# Patient Record
Sex: MALE | Race: WHITE | NOT HISPANIC OR LATINO | Employment: PART TIME | URBAN - METROPOLITAN AREA
[De-identification: names, ages, dates, MRNs, and addresses within clinical notes are randomized per-mention and may not be internally consistent; named-entity substitution may affect disease eponyms.]

---

## 2019-02-05 LAB
ALBUMIN: 4.3
ALP ISOS SERPL LEV INH-CCNC: 71 U/L
ALT SERPL-CCNC: 33 U/L
AST SERPL-CCNC: 31 U/L
BILIRUBIN TOTAL: 0.7
CALCIUM SERPL-MCNC: 9.6 MG/DL
CHLORIDE: 106
CHOLEST SERPL-MSCNC: 121 MG/DL (ref 0–200)
CO2 SERPL-SCNC: 31 MMOL/L
CREAT SERPL-MCNC: 1.3 MG/DL
GLUCOSE: 96
HDLC SERPL-MCNC: 51 MG/DL
LDLC SERPL CALC-MCNC: 56 MG/DL
POTASSIUM: 5.1
RISK FACTOR: 2.4
SODIUM: 142
TOTAL PROTEIN: 6.2 G/DL (ref 6.4–8.2)
TRIGL SERPL-MCNC: 69 MG/DL
UREA NITROGEN (BUN): 21
VLDL CHOLESTEROL: 14 MG/DL

## 2019-06-27 LAB
CREAT SERPL-MCNC: 1.2 MG/DL
PSA, SERUM: 0.4

## 2019-07-23 ENCOUNTER — PATIENT OUTREACH (OUTPATIENT)
Dept: ADMINISTRATIVE | Facility: HOSPITAL | Age: 64
End: 2019-07-23

## 2019-07-24 ENCOUNTER — OFFICE VISIT (OUTPATIENT)
Dept: FAMILY MEDICINE | Facility: CLINIC | Age: 64
End: 2019-07-24
Payer: COMMERCIAL

## 2019-07-24 ENCOUNTER — LAB VISIT (OUTPATIENT)
Dept: LAB | Facility: HOSPITAL | Age: 64
End: 2019-07-24
Attending: FAMILY MEDICINE
Payer: COMMERCIAL

## 2019-07-24 VITALS
HEART RATE: 43 BPM | SYSTOLIC BLOOD PRESSURE: 148 MMHG | DIASTOLIC BLOOD PRESSURE: 88 MMHG | WEIGHT: 193 LBS | TEMPERATURE: 98 F | HEIGHT: 69 IN | BODY MASS INDEX: 28.58 KG/M2

## 2019-07-24 DIAGNOSIS — I48.20 ATRIAL FIBRILLATION, CHRONIC: ICD-10-CM

## 2019-07-24 DIAGNOSIS — Z00.01 ENCOUNTER FOR GENERAL ADULT MEDICAL EXAMINATION WITH ABNORMAL FINDINGS: ICD-10-CM

## 2019-07-24 DIAGNOSIS — Z79.899 ENCOUNTER FOR LONG-TERM (CURRENT) USE OF MEDICATIONS: ICD-10-CM

## 2019-07-24 DIAGNOSIS — E78.5 HYPERLIPIDEMIA, UNSPECIFIED HYPERLIPIDEMIA TYPE: ICD-10-CM

## 2019-07-24 DIAGNOSIS — Z00.01 ENCOUNTER FOR GENERAL ADULT MEDICAL EXAMINATION WITH ABNORMAL FINDINGS: Primary | ICD-10-CM

## 2019-07-24 DIAGNOSIS — E55.9 VITAMIN D DEFICIENCY: ICD-10-CM

## 2019-07-24 DIAGNOSIS — Z11.59 NEED FOR HEPATITIS C SCREENING TEST: ICD-10-CM

## 2019-07-24 DIAGNOSIS — I10 HYPERTENSION, ESSENTIAL: ICD-10-CM

## 2019-07-24 LAB
25(OH)D3+25(OH)D2 SERPL-MCNC: 27 NG/ML (ref 30–96)
ALBUMIN SERPL BCP-MCNC: 3.6 G/DL (ref 3.5–5.2)
ALP SERPL-CCNC: 78 U/L (ref 55–135)
ALT SERPL W/O P-5'-P-CCNC: 92 U/L (ref 10–44)
ANION GAP SERPL CALC-SCNC: 8 MMOL/L (ref 8–16)
AST SERPL-CCNC: 106 U/L (ref 10–40)
BASOPHILS # BLD AUTO: 0.04 K/UL (ref 0–0.2)
BASOPHILS NFR BLD: 0.7 % (ref 0–1.9)
BILIRUB SERPL-MCNC: 0.7 MG/DL (ref 0.1–1)
BILIRUB UR QL STRIP: NEGATIVE
BUN SERPL-MCNC: 18 MG/DL (ref 8–23)
CALCIUM SERPL-MCNC: 9.9 MG/DL (ref 8.7–10.5)
CHLORIDE SERPL-SCNC: 103 MMOL/L (ref 95–110)
CLARITY UR: CLEAR
CO2 SERPL-SCNC: 29 MMOL/L (ref 23–29)
COLOR UR: YELLOW
CREAT SERPL-MCNC: 1 MG/DL (ref 0.5–1.4)
DIFFERENTIAL METHOD: NORMAL
EOSINOPHIL # BLD AUTO: 0.1 K/UL (ref 0–0.5)
EOSINOPHIL NFR BLD: 2.1 % (ref 0–8)
ERYTHROCYTE [DISTWIDTH] IN BLOOD BY AUTOMATED COUNT: 13.3 % (ref 11.5–14.5)
EST. GFR  (AFRICAN AMERICAN): >60 ML/MIN/1.73 M^2
EST. GFR  (NON AFRICAN AMERICAN): >60 ML/MIN/1.73 M^2
ESTIMATED AVG GLUCOSE: 111 MG/DL (ref 68–131)
GLUCOSE SERPL-MCNC: 88 MG/DL (ref 70–110)
GLUCOSE UR QL STRIP: NEGATIVE
HBA1C MFR BLD HPLC: 5.5 % (ref 4–5.6)
HCT VFR BLD AUTO: 47.9 % (ref 40–54)
HGB BLD-MCNC: 15.6 G/DL (ref 14–18)
HGB UR QL STRIP: NEGATIVE
IMM GRANULOCYTES # BLD AUTO: 0.03 K/UL (ref 0–0.04)
IMM GRANULOCYTES NFR BLD AUTO: 0.5 % (ref 0–0.5)
KETONES UR QL STRIP: NEGATIVE
LEUKOCYTE ESTERASE UR QL STRIP: NEGATIVE
LYMPHOCYTES # BLD AUTO: 1.4 K/UL (ref 1–4.8)
LYMPHOCYTES NFR BLD: 24.6 % (ref 18–48)
MCH RBC QN AUTO: 30.8 PG (ref 27–31)
MCHC RBC AUTO-ENTMCNC: 32.6 G/DL (ref 32–36)
MCV RBC AUTO: 95 FL (ref 82–98)
MONOCYTES # BLD AUTO: 0.8 K/UL (ref 0.3–1)
MONOCYTES NFR BLD: 13 % (ref 4–15)
NEUTROPHILS # BLD AUTO: 3.4 K/UL (ref 1.8–7.7)
NEUTROPHILS NFR BLD: 59.1 % (ref 38–73)
NITRITE UR QL STRIP: NEGATIVE
NRBC BLD-RTO: 0 /100 WBC
PH UR STRIP: 6 [PH] (ref 5–8)
PLATELET # BLD AUTO: 198 K/UL (ref 150–350)
PMV BLD AUTO: 11.4 FL (ref 9.2–12.9)
POTASSIUM SERPL-SCNC: 4.7 MMOL/L (ref 3.5–5.1)
PROT SERPL-MCNC: 6.5 G/DL (ref 6–8.4)
PROT UR QL STRIP: NEGATIVE
RBC # BLD AUTO: 5.06 M/UL (ref 4.6–6.2)
SODIUM SERPL-SCNC: 140 MMOL/L (ref 136–145)
SP GR UR STRIP: 1.02 (ref 1–1.03)
T3FREE SERPL-MCNC: 2.6 PG/ML (ref 2.3–4.2)
T4 SERPL-MCNC: 10.7 UG/DL (ref 4.5–11.5)
TSH SERPL DL<=0.005 MIU/L-ACNC: 1.47 UIU/ML (ref 0.4–4)
URN SPEC COLLECT METH UR: NORMAL
WBC # BLD AUTO: 5.78 K/UL (ref 3.9–12.7)

## 2019-07-24 PROCEDURE — 83036 HEMOGLOBIN GLYCOSYLATED A1C: CPT

## 2019-07-24 PROCEDURE — 84436 ASSAY OF TOTAL THYROXINE: CPT

## 2019-07-24 PROCEDURE — 99999 PR PBB SHADOW E&M-EST. PATIENT-LVL III: ICD-10-PCS | Mod: PBBFAC,,, | Performed by: FAMILY MEDICINE

## 2019-07-24 PROCEDURE — 80061 LIPID PANEL: CPT

## 2019-07-24 PROCEDURE — 84443 ASSAY THYROID STIM HORMONE: CPT

## 2019-07-24 PROCEDURE — 80053 COMPREHEN METABOLIC PANEL: CPT

## 2019-07-24 PROCEDURE — 84481 FREE ASSAY (FT-3): CPT

## 2019-07-24 PROCEDURE — 36415 COLL VENOUS BLD VENIPUNCTURE: CPT | Mod: PO

## 2019-07-24 PROCEDURE — 85025 COMPLETE CBC W/AUTO DIFF WBC: CPT

## 2019-07-24 PROCEDURE — 99999 PR PBB SHADOW E&M-EST. PATIENT-LVL III: CPT | Mod: PBBFAC,,, | Performed by: FAMILY MEDICINE

## 2019-07-24 PROCEDURE — 99386 PREV VISIT NEW AGE 40-64: CPT | Mod: S$GLB,,, | Performed by: FAMILY MEDICINE

## 2019-07-24 PROCEDURE — 99386 PR PREVENTIVE VISIT,NEW,40-64: ICD-10-PCS | Mod: S$GLB,,, | Performed by: FAMILY MEDICINE

## 2019-07-24 PROCEDURE — 81002 URINALYSIS NONAUTO W/O SCOPE: CPT | Mod: PO

## 2019-07-24 PROCEDURE — 82306 VITAMIN D 25 HYDROXY: CPT

## 2019-07-24 PROCEDURE — 86803 HEPATITIS C AB TEST: CPT

## 2019-07-24 RX ORDER — NAPROXEN SODIUM 220 MG/1
TABLET, FILM COATED ORAL
COMMUNITY

## 2019-07-24 RX ORDER — AMIODARONE HYDROCHLORIDE 200 MG/1
TABLET ORAL
COMMUNITY
End: 2022-10-21

## 2019-07-24 RX ORDER — SILDENAFIL CITRATE 20 MG/1
TABLET ORAL
Refills: 2 | COMMUNITY
Start: 2019-07-18 | End: 2019-10-29 | Stop reason: ALTCHOICE

## 2019-07-24 RX ORDER — ACETAMINOPHEN 500 MG
TABLET ORAL
COMMUNITY
End: 2019-07-30

## 2019-07-24 RX ORDER — ROSUVASTATIN CALCIUM 40 MG/1
TABLET, COATED ORAL
COMMUNITY
End: 2020-12-10

## 2019-07-24 RX ORDER — METOPROLOL TARTRATE 25 MG/1
TABLET, FILM COATED ORAL
COMMUNITY

## 2019-07-24 RX ORDER — EZETIMIBE 10 MG/1
TABLET ORAL
COMMUNITY
End: 2020-08-19 | Stop reason: SDUPTHER

## 2019-07-24 RX ORDER — DIGOXIN 250 MCG
TABLET ORAL
COMMUNITY
End: 2021-11-19

## 2019-07-24 NOTE — PATIENT INSTRUCTIONS
Healthy Heart Diet  GENERAL INFORMATION:  What is a heart healthy diet? The goal of a heart healthy diet is to decrease your risk for heart disease. There are several health conditions that can increase your risk for heart disease. Some of these conditions include unhealthy blood cholesterol levels, high blood pressure, and obesity (weighing more than your caregiver recommends). If you have any of these conditions, you should make diet and lifestyle changes as part of your treatment plan. People who have had a heart attack or stroke also should follow the heart healthy diet. The heart healthy diet may help to decrease the risk of having another heart attack or stroke.  What should I know about the different types of fat in my diet?   Unhealthy fats: A diet that is high in cholesterol, saturated fat, and trans fat may cause unhealthy cholesterol levels.    Cholesterol: Limit intake of cholesterol to less than 200 mg per day. Cholesterol is found in meat, eggs, and dairy (milk, cheese).    Saturated fat: Limit saturated fat to less than seven percent of total daily calories. Ask your caregiver how many calories you need each day. Saturated fats are found in meat and dairy.    Trans fat: Limit trans fat to less than one percent of total calories. Ask your caregiver how many calories you need each day. Foods that say trans fat free on the label may still have up to 0.5 grams of fat per serving. Trans fats are used in fried and baked foods.  Healthy fats: Unsaturated fats can be healthy for you and can help to improve your cholesterol levels. Increase your intake of healthy fats by replacing saturated and trans fats in your diet with unsaturated fat.     Monounsaturated fats: Monounsaturated fats are found in nuts and vegetable oils, such as olive, canola, safflower, and sunflower.    Polyunsaturated fats: Unsaturated fat can be found in vegetable oils, such as soybean or corn. Omega-3 fats are a type of polyunsaturated  fat that can help to decrease the risk of heart disease. Omega-3 fats are found in fish, such as salmon, herring, trout, and tuna. Omega-3 fats can also be found in plant foods, such as walnuts, flaxseed, soybeans, and canola oil.  What are other diet guidelines I should follow?   Maintain a healthy weight: Your risk of heart disease is higher if you are overweight. Your caregiver may suggest that you lose weight if you are overweight. The following are some diet changes you can make to lose weight.     Eat fewer calories: A healthy way of decreasing calories is to eat fewer foods that have added sugars and fats. Foods that are have added sugars are also high in calories, which can cause you to gain weight. Some foods that have added sugars are sweet drinks (soda and fruit drinks), candy, cakes, cookies, and pies.    Eat smaller portions: You can also decrease calories in your diet by eating smaller portions at each meal and eating fewer snacks. Ask your caregiver for more information about how to lose weight.  Decrease sodium in your diet to less than 2300 mg each day: Sodium is found in table salt and foods that have added salt. A diet that is lower in sodium may decrease blood pressure or prevent high blood pressure. Keep your blood pressure within a normal range to decrease your risk of stroke, heart disease, and heart failure.    Include omega-3 fats in your diet: Eat two servings of fish per week. One serving is about four ounces. Fish is a good source of healthy omega-3 fats. Most fish contain some mercury, but many fish contain levels that are not harmful to most people. Higher amounts of mercury can be harmful to pregnant women and children. Children and pregnant women should avoid eating fish high in mercury, such as shark or swordfish. Fish that have lower amounts of mercury include salmon, canned light tuna, and catfish.    Include high fiber foods in your diet each day: You can decrease your risk of  heart disease by following a diet that is high in fiber. Include fruit and vegetables, legumes (beans), and whole-grain foods in your diet each day to get enough fiber.    Limit alcohol: Limit the amount of alcohol you drink. Drinking too much can damage your brain, heart, and liver. The risk of getting high blood pressure and certain types of cancer are greater for people who drink too much alcohol. Drinking too much alcohol also increases the risk of having a stroke. Women should limit alcohol to one drink a day. Men should limit alcohol to two drinks a day. A drink of alcohol is 12 ounces of beer, or five ounces of wine. One and one-half ounces of liquor, such as whiskey, is one drink of alcohol. If you drink alcohol, talk to your caregiver.   What should I avoid eating and drinking while on a heart healthy diet? Learn to read labels on packaged foods before buying them. Ask your caregiver for more information about how to read food labels. The following foods are high in fat, saturated fat, cholesterol, and sodium.   Bread and other carbohydrates:     High-fat baked goods, such as doughnuts, pastries, cookies, and biscuits.    Chips, snack mixes, regular crackers, and flavored popcorn.    Pretzels, salted nuts (high in sodium).  Fruit and vegetables:     Regular, canned vegetables (high in sodium).    Fried vegetables or vegetables in butter or high-fat sauces.    Fried fruit, or fruit served with cream.  Dairy:     Whole milk, two percent milk, half-and-half creamer.    Cheese, cream cheese, sour cream.  Meats and meat substitutes:     High-fat cuts of meat (T-bone steak, regular hamburger, ribs, tiwari, sausage).    Cold cuts and hot dogs.    Whole eggs and egg yolks (limit to three servings or less per week).  Fats:     Butter, hard margarine, shortening, partially hydrogenated or tropical (coconut, palm) oils.    Other:     Salt or seasonings made with salt (high in sodium).    Soy sauce, miso soup, canned or  dried soups (high in sodium).    Ketchup, barbecue sauce, and other high-sodium sauces.    High-fat gravy and sauces, such as Dilan or cheese sauces.  What can I eat and drink while on a heart healthy diet? Ask your dietitian or caregiver how many servings to eat each day from each of the following groups of foods. The amount of servings you should eat from each food group will depend on your daily calorie needs.   Breads and other carbohydrates:     Whole grain breads, cereals (oatmeal), and pasta.    Brown rice.    Low fat, low-sodium crackers and pretzels.  Fruits and vegetables:     Fresh, frozen, or canned vegetables (no salt or low-sodium).    Fresh, frozen, dried, or canned fruit (canned in light syrup or fruit juice).  Dairy:     Nonfat (skim), one-half percent, or one percent milk.    Nonfat or low fat yogurt or cottage cheese.    Fat free or low fat cheese.  Meats and meat products:     Fish and poultry (chicken, turkey) with no skin.    Lean beef and pork (loin, round, extra lean hamburger).    Dried beans and peas, unsalted nuts, soy products.    Egg whites and substitutes.  Fats:     Unsaturated oils (olive, soy, peanut, canola, safflower, sunflower).    Vegetable oil spreads or soft margarine.    Avocado.  Other:     Herbs and spices in place of salt.    Low fat snacks (unsalted pretzels, plain popcorn).  What are some other lifestyle changes I should make?   Do not smoke: Smoking causes lung cancer and other long-term lung diseases. It increases your risk of many cancer types. Smoking also increases your risk of blood vessel disease, heart attack, and vision disorders. Not smoking may help prevent such symptoms as headaches and dizziness for yourself and those around you. Smokers have shorter lifespans than nonsmokers.     Exercise regularly: Regular exercise can help improve your cholesterol levels and decrease your risk for coronary artery disease. Regular exercise can also help you reach or  maintain a healthy weight. Get 30 minutes or more of moderate exercise or 20 minutes of intense exercise on most days of the week. To lose weight, get at least 60 minutes of exercise on most days of the week. Children should exercise for at least 60 minutes each day. Talk to your caregiver about the best exercise program for you.  What are the risks of not following a heart healthy diet? You may develop heart disease if you do not follow a heart healthy diet. High blood cholesterol puts you at a higher risk for heart disease. Untreated high blood pressure may lead to a stroke. It can also lead to a heart attack or heart or kidney failure. Obesity is linked to medical problems, such as heart disease, high blood pressure, stroke, and diabetes. You may need to follow this diet if you already had a heart attack or stroke. You may be more likely to have another stroke or heart attack if you do not follow this diet.  When should I call my caregiver? You have questions or concerns about your illness, medicine, or this diet.  CARE AGREEMENT:  You have the right to help plan your care. Discuss treatment options with your caregivers to decide what care you want to receive. You always have the right to refuse treatment.

## 2019-07-24 NOTE — PROGRESS NOTES
Your urinalysis is normal.      If you have any other tests that were ordered we will notify you once they are available.  Please let me know if you have any questions concerning this test.  We will discuss further at your next office appointment.    Also please see below for health maintenance items that are due so we may discuss those at your next office visit:    PROSTATE-SPECIFIC ANTIGEN due on 1956  Hepatitis C Screening due on 1955  TETANUS VACCINE due on 06/02/1974    Joseph Garcia MD  We Offer Telehealth & Same Day Appointments!   Book your Telehealth appointment with me through my nurse or   Clinic appointments on [employer]  [phone]     Check out my Facebook Page and Follow Me at: [email or web address]    Check out my website at [employer] by clicking on: [email or web address]    To Schedule appointments online, go to [facility]: [email or web address]

## 2019-07-24 NOTE — PROGRESS NOTES
This note is specifically for wellness visit performed today.     WELLNESS EXAM      Patient ID: Johnny Sneed is a 64 y.o. male.    Chief Complaint:  Encounter for wellness exam    Well Adult Physical: Patient here for a comprehensive physical exam.The patient reports no problems.  Do you take any herbs or supplements that were not prescribed by a doctor? no Are you taking calcium supplements? no Are you taking aspirin daily? Yes    #1 Hypertension:  Blood pressure is elevated today.  Patient reports blood pressures at home run 130/80.  He checks them with the machine.  He is going to continue checking them and let me know within numbers are.  Continue metoprolol amiodarone digoxin.  #2.  Atrial fibrillation paroxysmal:  Chronic.  Stable.  Patient sees Dr. Ed Adams regularly.  Has had cardioversion several times.  Patient is on digoxin amiodarone metoprolol and Xarelto.  Reports compliance.  No side effects reported.  #3.  Hyperlipidemia:  Chronic.  Stable.  Patient takes Crestor 40 mg and Zetia 10 mg.  Patient is fasting today.  #4.  Vitamin-D deficiency:  Patient takes 2000 units of vitamin-D daily.  Unknown last level vitamin-D.    Patient unsure his has ever been screened for hepatitis C.  Patient was born in 1955.  No other risk factors.       History:  Patient receives prostate care outside our clinic  Sees urologist Dr. Seay  Date last prostate exam:  2019  Date last PSA:  Up-to-date 0.4 February 2019  No history of STDs        ==============================================  History reviewed.   Past Medical History:   Diagnosis Date    Atrial fibrillation     Diverticulosis     Hyperlipidemia     Hypertension       Reviewed Social history  and surgical history in detail per chart.   family history includes No Known Problems in his father and mother.   Medications reviewed per chart.    Review of patient's allergies indicates:  No Known Allergies        Last 3 sets of Vitals    Vitals - 1 value  "per visit 7/24/2019   SYSTOLIC 148   DIASTOLIC 88   PULSE 43   TEMPERATURE 98   Weight (lb) 193   Weight (kg) 87.544   HEIGHT 5' 9"   BODY MASS INDEX 28.5   VISIT REPORT -   Pain Score  0       Review of Systems   Constitutional: Negative for chills, fatigue, fever and unexpected weight change.   HENT: Negative for ear pain and sore throat.    Eyes: Negative for redness and visual disturbance.   Respiratory: Negative for cough and shortness of breath.    Cardiovascular: Negative for chest pain and palpitations.   Gastrointestinal: Negative for nausea and vomiting.   Musculoskeletal: Negative for arthralgias and myalgias.   Skin: Negative for rash and wound.   Neurological: Negative for weakness and headaches.         Objective:      Physical Exam   Constitutional: He is oriented to person, place, and time. He appears well-developed and well-nourished. No distress.   HENT:   Head: Normocephalic and atraumatic.   Eyes: Pupils are equal, round, and reactive to light. EOM are normal.   Neck: Normal range of motion. Neck supple.   Cardiovascular: Normal rate, regular rhythm, normal heart sounds and intact distal pulses.   No murmur heard.  Pulmonary/Chest: Effort normal and breath sounds normal. No respiratory distress. He has no wheezes.   Musculoskeletal: Normal range of motion. He exhibits no edema.   Neurological: He is alert and oriented to person, place, and time. No cranial nerve deficit.   Skin: Skin is warm and dry. Capillary refill takes less than 2 seconds.   Psychiatric: He has a normal mood and affect. His behavior is normal.   Nursing note and vitals reviewed.        Assessment / Plan:      1. Z00.01 -patient here for annual wellness exam.  Labs ordered.  Health maintenance was reviewed and ordered.  Hepatitis C screening  Hyperlipidemia, status post CABG- NMR  Hypertension/atrial fibrillation continue meds, continue follow-up  Continue follow-up with Urology for prostate care.    Complete history and " physical was completed today.  Complete and thorough medication reconciliation was performed.  Discussed risks and benefits of medications.  Advised patient on orders and health maintenance.  We discussed old records and old labs if available.  Will request any records not available through epic.  Continue current medications listed on your summary sheet.    Requesting records from colonoscopy.      All questions were answered. Patient had no further concerns. Advised of diagnoses and plan. Follow up as planned or return sooner if symptoms persist or worsen.       Joseph Garcia MD

## 2019-07-25 ENCOUNTER — TELEPHONE (OUTPATIENT)
Dept: FAMILY MEDICINE | Facility: CLINIC | Age: 64
End: 2019-07-25

## 2019-07-25 LAB — HCV AB SERPL QL IA: NEGATIVE

## 2019-07-25 NOTE — PROGRESS NOTES
See below.  Please call patient see if he has had elevations in liver enzymes before.  Start vitamin-D protocol.  Patient is already on daily supplementation.  Tell him to take knee 81697 units once a week for 3 months.  Then repeat the level    Patient's results were released through my chart and was notified.  Please follow up to make sure that they received the results.  Released results through my chart.    I have reviewed your recent blood work.    Your complete blood count is stable within normal limits.    Your metabolic panel which shows a glucose kidney function electrolytes and liver function is abnormal showing elevations in your liver enzymes.  AST and ALT.  Have you ever had elevations in your liver enzymes before?   Thyroid studies are stable within normal limits.   Your cholesterol is pending.    Your vitamin-D is  low in you need to start on higher dose supplementation.  We will send supplementation to pharmacy.  Repeat in 3 to 6 months.  This is a once weekly medication.   .  Your hemoglobin A1c is normal.     We will discuss these results in detail at your next office visit.  Please let me know if you have any questions concerning these laboratory results.

## 2019-07-25 NOTE — PROGRESS NOTES
Your hepatitis C screening test was negative.  So you do not have hepatitis-C.    Joseph Garcia MD  We Offer Telehealth & Same Day Appointments!   Book your Telehealth appointment with me through my nurse or   Clinic appointments on [employer]  [phone]     Check out my Facebook Page and Follow Me at: [email or web address]    Check out my website at [employer] by clicking on: [email or web address]    To Schedule appointments online, go to [employer]: [email or web address]

## 2019-07-25 NOTE — TELEPHONE ENCOUNTER
----- Message from Joseph Garcia MD sent at 7/25/2019 11:38 AM CDT -----  See below.  Please call patient see if he has had elevations in liver enzymes before.  Start vitamin-D protocol.  Patient is already on daily supplementation.  Tell him to take knee 37631 units once a week for 3 months.  Then repeat the level    Patient's results were released through my chart and was notified.  Please follow up to make sure that they received the results.  Released results through my chart.    I have reviewed your recent blood work.    Your complete blood count is stable within normal limits.    Your metabolic panel which shows a glucose kidney function electrolytes and liver function is abnormal showing elevations in your liver enzymes.  AST and ALT.  Have you ever had elevations in your liver enzymes before?   Thyroid studies are stable within normal limits.   Your cholesterol is pending.    Your vitamin-D is  low in you need to start on higher dose supplementation.  We will send supplementation to pharmacy.  Repeat in 3 to 6 months.  This is a once weekly medication.   .  Your hemoglobin A1c is normal.     We will discuss these results in detail at your next office visit.  Please let me know if you have any questions concerning these laboratory results.

## 2019-07-28 LAB
CHOLEST SERPL-MCNC: 138 MG/DL
HDL SERPL QN: 8.9 NM
HDL SERPL-SCNC: 32.1 UMOL/L
HDLC SERPL-MCNC: 46 MG/DL (ref 40–59)
HLD.LARGE SERPL-SCNC: 4.1 UMOL/L
LDL SERPL QN: 20.8 NM
LDL SERPL-SCNC: 933 NMOL/L
LDL SMALL SERPL-SCNC: 598 NMOL/L
LDLC SERPL CALC-MCNC: 75 MG/DL
PATHOLOGY STUDY: ABNORMAL
TRIGL SERPL-MCNC: 83 MG/DL (ref 30–149)
VLDL LARGE SERPL-SCNC: <1.5 NMOL/L
VLDL SERPL QN: 48.3 NM

## 2019-07-29 NOTE — PROGRESS NOTES
Patient's results were released through my chart and was notified.  Please follow up to make sure that they received the results.  Released results through my chart.    I have reviewed your recent blood work.      Your cholesterol is within normal limits on medication.  Continue rosuvastatin.  Will repeat this test in 1 year.  Continue to work on diet and exercise.  You can add fish oil supplementation to increase you benefits of decreasing inflammation of cholesterol.    We will discuss these results in detail at your next office visit.  Please let me know if you have any questions concerning these laboratory results.

## 2019-07-30 ENCOUNTER — PATIENT OUTREACH (OUTPATIENT)
Dept: ADMINISTRATIVE | Facility: HOSPITAL | Age: 64
End: 2019-07-30

## 2019-07-30 DIAGNOSIS — E55.9 VITAMIN D DEFICIENCY: Primary | ICD-10-CM

## 2019-07-30 RX ORDER — ERGOCALCIFEROL 1.25 MG/1
50000 CAPSULE ORAL
Qty: 4 CAPSULE | Refills: 3 | Status: SHIPPED | OUTPATIENT
Start: 2019-07-30 | End: 2019-11-05 | Stop reason: SDUPTHER

## 2019-07-30 NOTE — TELEPHONE ENCOUNTER
Signed.  Please make sure patient has appointment to get labs done. Use Vit D def for linking these. E55.9

## 2019-07-30 NOTE — TELEPHONE ENCOUNTER
----- Message from Joseph Garcia MD sent at 7/25/2019 11:38 AM CDT -----  See below.  Please call patient see if he has had elevations in liver enzymes before.  Start vitamin-D protocol.  Patient is already on daily supplementation.  Tell him to take knee 10302 units once a week for 3 months.  Then repeat the level    Patient's results were released through my chart and was notified.  Please follow up to make sure that they received the results.  Released results through my chart.    I have reviewed your recent blood work.    Your complete blood count is stable within normal limits.    Your metabolic panel which shows a glucose kidney function electrolytes and liver function is abnormal showing elevations in your liver enzymes.  AST and ALT.  Have you ever had elevations in your liver enzymes before?   Thyroid studies are stable within normal limits.   Your cholesterol is pending.    Your vitamin-D is  low in you need to start on higher dose supplementation.  We will send supplementation to pharmacy.  Repeat in 3 to 6 months.  This is a once weekly medication.   .  Your hemoglobin A1c is normal.     We will discuss these results in detail at your next office visit.  Please let me know if you have any questions concerning these laboratory results.

## 2019-08-07 ENCOUNTER — PATIENT MESSAGE (OUTPATIENT)
Dept: FAMILY MEDICINE | Facility: CLINIC | Age: 64
End: 2019-08-07

## 2019-08-20 ENCOUNTER — PATIENT OUTREACH (OUTPATIENT)
Dept: ADMINISTRATIVE | Facility: HOSPITAL | Age: 64
End: 2019-08-20

## 2019-10-28 PROBLEM — Z00.01 ENCOUNTER FOR GENERAL ADULT MEDICAL EXAMINATION WITH ABNORMAL FINDINGS: Status: RESOLVED | Noted: 2019-07-24 | Resolved: 2019-10-28

## 2019-11-05 ENCOUNTER — LAB VISIT (OUTPATIENT)
Dept: LAB | Facility: HOSPITAL | Age: 64
End: 2019-11-05
Attending: FAMILY MEDICINE
Payer: COMMERCIAL

## 2019-11-05 DIAGNOSIS — E55.9 VITAMIN D DEFICIENCY: ICD-10-CM

## 2019-11-05 LAB — 25(OH)D3+25(OH)D2 SERPL-MCNC: 28 NG/ML (ref 30–96)

## 2019-11-05 PROCEDURE — 36415 COLL VENOUS BLD VENIPUNCTURE: CPT | Mod: PO

## 2019-11-05 PROCEDURE — 82306 VITAMIN D 25 HYDROXY: CPT

## 2019-11-06 DIAGNOSIS — Z79.899 ENCOUNTER FOR LONG-TERM (CURRENT) USE OF MEDICATIONS: ICD-10-CM

## 2019-11-06 DIAGNOSIS — Z77.098 EXPOSURE TO CHEMICAL COMPOUNDS: ICD-10-CM

## 2019-11-06 DIAGNOSIS — E78.5 HYPERLIPIDEMIA, UNSPECIFIED HYPERLIPIDEMIA TYPE: ICD-10-CM

## 2019-11-06 DIAGNOSIS — I10 HYPERTENSION, ESSENTIAL: ICD-10-CM

## 2019-11-06 DIAGNOSIS — R74.8 ELEVATED LIVER ENZYMES: Primary | ICD-10-CM

## 2019-11-06 DIAGNOSIS — E55.9 VITAMIN D DEFICIENCY: ICD-10-CM

## 2019-11-06 RX ORDER — ERGOCALCIFEROL 1.25 MG/1
50000 CAPSULE ORAL
Qty: 4 CAPSULE | Refills: 11 | Status: SHIPPED | OUTPATIENT
Start: 2019-11-06 | End: 2020-11-20 | Stop reason: SDUPTHER

## 2019-11-06 RX ORDER — CHOLECALCIFEROL (VITAMIN D3) 25 MCG
2000 TABLET ORAL DAILY
COMMUNITY
End: 2021-05-07

## 2019-11-06 NOTE — PROGRESS NOTES
CALL THE PATIENT to discuss results and document verification.    Please set up liver ultrasound in January and lab appointment on the same day with lipid panel, complete metabolic panel, vitamin-D    I have sent a message to them with the interpretation (see below).  See if they have any questions and make a follow-up appointment if not already schedule and if needed.    Also please address any outstanding health maintenance that may be due: TETANUS VACCINE due on 06/02/1974  Colonoscopy due on 06/02/2006  ====================================    My interpretation that was sent to them through [employer]:    Your vitamin-D is still low on supplementation with 96609 units weekly.  Increase supplementation by 2000 units daily with over-the-counter supplement.  Continue 47565 units weekly.  Will recheck level in January when we check your liver function.  Also will set up ultrasound of the liver prior to your January appointment.

## 2020-01-16 ENCOUNTER — TELEPHONE (OUTPATIENT)
Dept: RADIOLOGY | Facility: HOSPITAL | Age: 65
End: 2020-01-16

## 2020-01-17 ENCOUNTER — PATIENT OUTREACH (OUTPATIENT)
Dept: ADMINISTRATIVE | Facility: HOSPITAL | Age: 65
End: 2020-01-17

## 2020-01-17 ENCOUNTER — HOSPITAL ENCOUNTER (OUTPATIENT)
Dept: RADIOLOGY | Facility: HOSPITAL | Age: 65
Discharge: HOME | End: 2020-01-17
Attending: FAMILY MEDICINE
Payer: COMMERCIAL

## 2020-01-17 DIAGNOSIS — Z77.098 EXPOSURE TO CHEMICAL COMPOUNDS: ICD-10-CM

## 2020-01-17 DIAGNOSIS — R74.8 ELEVATED LIVER ENZYMES: ICD-10-CM

## 2020-01-17 PROCEDURE — 76700 US ABDOMEN COMPLETE: ICD-10-PCS | Mod: 26,,, | Performed by: RADIOLOGY

## 2020-01-17 PROCEDURE — 76700 US EXAM ABDOM COMPLETE: CPT | Mod: 26,,, | Performed by: RADIOLOGY

## 2020-01-17 PROCEDURE — 76700 US EXAM ABDOM COMPLETE: CPT | Mod: TC,PO

## 2020-01-24 ENCOUNTER — OFFICE VISIT (OUTPATIENT)
Dept: FAMILY MEDICINE | Facility: CLINIC | Age: 65
End: 2020-01-24
Payer: COMMERCIAL

## 2020-01-24 VITALS
HEIGHT: 69 IN | SYSTOLIC BLOOD PRESSURE: 139 MMHG | HEART RATE: 64 BPM | TEMPERATURE: 98 F | WEIGHT: 196.81 LBS | DIASTOLIC BLOOD PRESSURE: 86 MMHG | BODY MASS INDEX: 29.15 KG/M2

## 2020-01-24 DIAGNOSIS — Z79.899 ENCOUNTER FOR LONG-TERM (CURRENT) USE OF MEDICATIONS: Chronic | ICD-10-CM

## 2020-01-24 DIAGNOSIS — E55.9 VITAMIN D DEFICIENCY: Chronic | ICD-10-CM

## 2020-01-24 DIAGNOSIS — I48.92 ATRIAL FLUTTER, UNSPECIFIED TYPE: ICD-10-CM

## 2020-01-24 DIAGNOSIS — I48.20 ATRIAL FIBRILLATION, CHRONIC: Chronic | ICD-10-CM

## 2020-01-24 DIAGNOSIS — K76.0 FATTY LIVER: ICD-10-CM

## 2020-01-24 DIAGNOSIS — E78.5 HYPERLIPIDEMIA, UNSPECIFIED HYPERLIPIDEMIA TYPE: Chronic | ICD-10-CM

## 2020-01-24 DIAGNOSIS — I10 HYPERTENSION, ESSENTIAL: Chronic | ICD-10-CM

## 2020-01-24 DIAGNOSIS — R74.8 ELEVATED LIVER ENZYMES: Primary | ICD-10-CM

## 2020-01-24 PROCEDURE — 3075F PR MOST RECENT SYSTOLIC BLOOD PRESS GE 130-139MM HG: ICD-10-PCS | Mod: CPTII,S$GLB,, | Performed by: FAMILY MEDICINE

## 2020-01-24 PROCEDURE — 3075F SYST BP GE 130 - 139MM HG: CPT | Mod: CPTII,S$GLB,, | Performed by: FAMILY MEDICINE

## 2020-01-24 PROCEDURE — 99999 PR PBB SHADOW E&M-EST. PATIENT-LVL III: CPT | Mod: PBBFAC,,, | Performed by: FAMILY MEDICINE

## 2020-01-24 PROCEDURE — 99396 PR PREVENTIVE VISIT,EST,40-64: ICD-10-PCS | Mod: S$GLB,,, | Performed by: FAMILY MEDICINE

## 2020-01-24 PROCEDURE — 3079F DIAST BP 80-89 MM HG: CPT | Mod: CPTII,S$GLB,, | Performed by: FAMILY MEDICINE

## 2020-01-24 PROCEDURE — 99999 PR PBB SHADOW E&M-EST. PATIENT-LVL III: ICD-10-PCS | Mod: PBBFAC,,, | Performed by: FAMILY MEDICINE

## 2020-01-24 PROCEDURE — 3079F PR MOST RECENT DIASTOLIC BLOOD PRESSURE 80-89 MM HG: ICD-10-PCS | Mod: CPTII,S$GLB,, | Performed by: FAMILY MEDICINE

## 2020-01-24 PROCEDURE — 99396 PREV VISIT EST AGE 40-64: CPT | Mod: S$GLB,,, | Performed by: FAMILY MEDICINE

## 2020-01-24 RX ORDER — SILDENAFIL CITRATE 20 MG/1
TABLET ORAL
COMMUNITY
Start: 2019-12-20 | End: 2022-02-05

## 2020-01-24 NOTE — ASSESSMENT & PLAN NOTE
Patient with known fatty liver.  LFTs are stable.  Incidental finding of gallstones.  ER precautions were given.  Follow-up with GI.

## 2020-01-24 NOTE — ASSESSMENT & PLAN NOTE
Blood pressure goal less than 140/90.Discussed hypertension disease course, DASH-diet and exercise.  Discussed medication regimen importance of treating high blood pressure.  Patient advised of risk of untreated blood pressure.    ER precautions were given for symptoms of hypertensive urgency and emergency.

## 2020-01-24 NOTE — PROGRESS NOTES
PLAN:      Problem List Items Addressed This Visit     Hyperlipidemia (Chronic)     Recheck lipids in one year.  Recheck LFTs in six months.  Discussed hyperlipidemia disease course, healthy diet and increased need for exercise.  Discussed the risk of cardiovascular disease, increase stroke and heart attack risk.    Patient voiced understanding and understood the treatment plan. All questions were answered.              Atrial fibrillation, chronic (Chronic)     Continue medication regimen.  Follow-up with Cardiology.         Encounter for long-term (current) use of medications (Chronic)     Medication refills as needed.  Reviewed labs.  Stable LFTs.         Vitamin D deficiency (Chronic)     Continue vitamin-D supplementation.  Recheck in one year.         Hypertension, essential (Chronic)     Blood pressure goal less than 140/90.Discussed hypertension disease course, DASH-diet and exercise.  Discussed medication regimen importance of treating high blood pressure.  Patient advised of risk of untreated blood pressure.    ER precautions were given for symptoms of hypertensive urgency and emergency.           Atrial flutter    Elevated liver enzymes - Primary     Repeat hepatic function in six months.  Follow-up with GI.         Relevant Orders    Hepatic function panel    Fatty liver     Patient with known fatty liver.  LFTs are stable.  Incidental finding of gallstones.  ER precautions were given.  Follow-up with GI.             Future Appointments     Date Provider Specialty Appt Notes    7/24/2020  Lab     1/25/2021 Joseph Garcia MD Family Medicine annual           Medication List with Changes/Refills   Current Medications    AMIODARONE (PACERONE) 200 MG TAB        ASPIRIN 81 MG CHEW        DIGOXIN (LANOXIN) 250 MCG TABLET        ERGOCALCIFEROL (ERGOCALCIFEROL) 50,000 UNIT CAP    Take 1 capsule (50,000 Units total) by mouth every 7 days.    EZETIMIBE (ZETIA) 10 MG TABLET        METOPROLOL TARTRATE (LOPRESSOR)  25 MG TABLET        RIVAROXABAN (XARELTO) 20 MG TAB    Take 1 tablet (20 mg total) by mouth once daily.    ROSUVASTATIN (CRESTOR) 40 MG TAB        SILDENAFIL (REVATIO) 20 MG TAB    TAKE 1 TABET BY MOUTH DAILY AS NEEDED AS DIRECTED    VITAMIN D (VITAMIN D3) 1000 UNITS TAB    Take 2,000 Units by mouth once daily.       Joseph Garcia M.D.     ==========================================================================  Subjective:      Patient ID: Johnny Sneed is a 64 y.o. male.  has a past medical history of Atrial fibrillation, Diverticulosis, Hyperlipidemia, Hypertension, and Ruptured eardrum.     Chief Complaint: Annual Exam      Problem List Items Addressed This Visit     Hyperlipidemia (Chronic)    Overview     CHRONIC. STABLE. Lab analysis reviewed.   =======================================================  JANUARY 2020:  Stable on rosuvastatin 40 mg.  ======================================================  (-) CP, SOB, abdominal pain, N/V/D, constipation, jaundice, skin changes.  (-) Myalgias  Lab Results   Component Value Date    CHOL 131 01/17/2020    CHOL 121 02/05/2019     Lab Results   Component Value Date    HDL 47 01/17/2020    HDL 51 02/05/2019     Lab Results   Component Value Date    LDLCALC 71.6 01/17/2020    LDLCALC 56 02/05/2019     Lab Results   Component Value Date    TRIG 62 01/17/2020    TRIG 69 02/05/2019     Lab Results   Component Value Date    CHOLHDL 35.9 01/17/2020     Lab Results   Component Value Date    TOTALCHOLEST 2.8 01/17/2020     Lab Results   Component Value Date    ALT 93 (H) 01/17/2020    AST 84 (H) 01/17/2020    ALKPHOS 68 01/17/2020    BILITOT 0.5 01/17/2020     ======================================================           Current Assessment & Plan     Recheck lipids in one year.  Recheck LFTs in six months.  Discussed hyperlipidemia disease course, healthy diet and increased need for exercise.  Discussed the risk of cardiovascular disease, increase stroke and  heart attack risk.    Patient voiced understanding and understood the treatment plan. All questions were answered.              Atrial fibrillation, chronic (Chronic)    Overview     Initial HPI:  Chronic.  Stable.  History of several cardioversions in the past.  Patient taking amiodarone digoxin metoprolol and Xarelto.  Follows up with Dr. Ed Adams.  =======================================================  JANUARY 2020:  Patient reports that he was in a flutter at recent follow-up with Cardiology.  Patient feels well.  Stable medications.  ======================================================         Current Assessment & Plan     Continue medication regimen.  Follow-up with Cardiology.         Encounter for long-term (current) use of medications (Chronic)    Overview     CHRONIC. Stable. Compliant with medications for managed conditions. See medication list. No SE reported.   Routine lab analysis is being monitored. Refills were addressed.  Lab Results   Component Value Date    WBC 5.78 07/24/2019    HGB 15.6 07/24/2019    HCT 47.9 07/24/2019    MCV 95 07/24/2019     07/24/2019         Chemistry        Component Value Date/Time     01/17/2020 0741     02/05/2019    K 4.1 01/17/2020 0741    K 5.1 02/05/2019     01/17/2020 0741     02/05/2019    CO2 28 01/17/2020 0741    CO2 31 02/05/2019    BUN 22 01/17/2020 0741    BUN 21.0 02/05/2019    CREATININE 1.0 01/17/2020 0741    CREATININE 1.2 06/27/2019    GLU 84 01/17/2020 0741        Component Value Date/Time    CALCIUM 9.2 01/17/2020 0741    CALCIUM 9.6 02/05/2019    ALKPHOS 68 01/17/2020 0741    AST 84 (H) 01/17/2020 0741    AST 31 02/05/2019    ALT 93 (H) 01/17/2020 0741    ALT 33 02/05/2019    BILITOT 0.5 01/17/2020 0741    ESTGFRAFRICA >60.0 01/17/2020 0741    EGFRNONAA >60.0 01/17/2020 0741          Lab Results   Component Value Date    TSH 1.474 07/24/2019    N6CYJGX 10.7 07/24/2019    T3FREE 2.6 07/24/2019              Current  Assessment & Plan     Medication refills as needed.  Reviewed labs.  Stable LFTs.         Vitamin D deficiency (Chronic)    Overview     7/24/2019Vit d deficiency. Takes vitamin d supplement. No SE reported, due for level check.  ======================================================  January 2020:Chronic. Vit d deficiency. Takes vitamin d supplement. No SE reported. Fatigue is slightly improved.   Lab Results   Component Value Date    KQXXYOUN42VK 33 01/17/2020    MSVCCODJ34GK 28 (L) 11/05/2019    ZKENJTMM54IK 27 (L) 07/24/2019               Current Assessment & Plan     Continue vitamin-D supplementation.  Recheck in one year.         Hypertension, essential (Chronic)    Overview     Initial HPI:  Blood pressure is elevated today.  Patient reports blood pressures at home run 130/80.  He checks them with the machine.  He is going to continue checking them and let me know within numbers are.  Continue metoprolol amiodarone digoxin.  =======================================================  JANUARY 2020:  Well controlled today.  CHRONIC. STABLE. BP Reviewed.  Compliant with BP medications. No SE reported.   (-) CP, SOB, palpitations, dizziness, lightheadedness, HA, arm numbness, tingling or weakness, syncope.  Creatinine   Date Value Ref Range Status   01/17/2020 1.0 0.5 - 1.4 mg/dL Final   06/27/2019 1.2 mg/dL Final       ======================================================         Current Assessment & Plan     Blood pressure goal less than 140/90.Discussed hypertension disease course, DASH-diet and exercise.  Discussed medication regimen importance of treating high blood pressure.  Patient advised of risk of untreated blood pressure.    ER precautions were given for symptoms of hypertensive urgency and emergency.           Atrial flutter    Overview     New diagnosis recently found by Cardiology.  Stable medications.  No issues.         Elevated liver enzymes - Primary    Overview     Patient with chronic  elevation in LFTs.   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  Patient has known about the elevated liver enzymes and fatty liver for several years.  It is been monitored with routine blood work.    Patient is establish with [facility].  Reports he had a colonoscopy performed in or about 2015 and was told to return in 10 years.    Records have been requested.    Patient denies any abdominal pain nausea vomiting diarrhea.  No change in skin color.         Current Assessment & Plan     Repeat hepatic function in six months.  Follow-up with GI.         Fatty liver    Overview     US Abdomen Complete  Narrative: EXAMINATION:  US ABDOMEN COMPLETE    CLINICAL HISTORY:  elevated liver functions; Abnormal levels of other serum enzymes    TECHNIQUE:  Complete abdominal ultrasound (including pancreas, liver, gallbladder, common bile duct, spleen, aorta, IVC, and kidneys) was performed.    COMPARISON:  None    FINDINGS:  Complete abdominal ultrasound performed. The liver measures 13.6 cm in length, normal in size and is homogeneously increased in echotexture, most consistent with diffuse fatty infiltration..  Common bile duct is within normal limits at 0.62 cm.  There are 2 small stones noted within the gallbladder the largest measuring up to 6.6 mm.  No gallbladder wall thickening.  The sonographic Kitchen sign is reported as negative.  The visualized portions of the pancreas, IVC and abdominal aorta are within normal limits.   The right kidney measures 10.3 cm. The left kidney measures 11.0 cm. No hydronephrosis or renal masses identified.  The spleen measures 9.9 cm.  Impression: 1. Diffuse fatty infiltration of the liver.  2. Cholelithiasis.  .    Electronically signed by: Caleb Fitzgerald DO  Date:    01/17/2020  Time:    09:01              Current Assessment & Plan     Patient with known fatty liver.  LFTs are stable.  Incidental finding of gallstones.  ER precautions were given.  Follow-up with GI.                Past Medical History:  Past Medical History:   Diagnosis Date    Atrial fibrillation     Diverticulosis     Hyperlipidemia     Hypertension     Ruptured eardrum      Past Surgical History:   Procedure Laterality Date    COLONOSCOPY  2015    [facility]    CORONARY ARTERY BYPASS GRAFT  09/2014    3 Vessel Cabbage    INNER EAR SURGERY Left 1980    NASAL SEPTUM SURGERY  03/2019    Dr Davis Obrien    PROSTATE BIOPSY  04/2011     Review of patient's allergies indicates:  No Known Allergies  Medication List with Changes/Refills   Current Medications    AMIODARONE (PACERONE) 200 MG TAB        ASPIRIN 81 MG CHEW        DIGOXIN (LANOXIN) 250 MCG TABLET        ERGOCALCIFEROL (ERGOCALCIFEROL) 50,000 UNIT CAP    Take 1 capsule (50,000 Units total) by mouth every 7 days.    EZETIMIBE (ZETIA) 10 MG TABLET        METOPROLOL TARTRATE (LOPRESSOR) 25 MG TABLET        RIVAROXABAN (XARELTO) 20 MG TAB    Take 1 tablet (20 mg total) by mouth once daily.    ROSUVASTATIN (CRESTOR) 40 MG TAB        SILDENAFIL (REVATIO) 20 MG TAB    TAKE 1 TABET BY MOUTH DAILY AS NEEDED AS DIRECTED    VITAMIN D (VITAMIN D3) 1000 UNITS TAB    Take 2,000 Units by mouth once daily.      Social History     Tobacco Use    Smoking status: Never Smoker    Smokeless tobacco: Never Used   Substance Use Topics    Alcohol use: Yes     Alcohol/week: 2.0 standard drinks     Types: 2 Glasses of wine per week     Frequency: Never      Family History   Problem Relation Age of Onset    Heart disease Mother         bypass    Cancer Father         Prostate removal    Heart disease Father         CHF    Vision loss Father         Macular degen       I have reviewed the complete PMH, social history, surgical history, allergies and medications.  As well as family  "history.    Review of Systems   Constitutional: Negative for activity change.   Eyes: Negative for discharge.   Respiratory: Negative for wheezing.    Cardiovascular: Negative for chest pain and palpitations.   Gastrointestinal: Negative for constipation, diarrhea and vomiting.   Genitourinary: Negative for difficulty urinating and hematuria.   Neurological: Negative for headaches.   Psychiatric/Behavioral: Negative for dysphoric mood.     Objective:   /86   Pulse 64   Temp 98.3 °F (36.8 °C) (Oral)   Ht 5' 9" (1.753 m)   Wt 89.3 kg (196 lb 12.8 oz)   BMI 29.06 kg/m²   Physical Exam   Constitutional: He is oriented to person, place, and time. He appears well-developed and well-nourished. No distress.   HENT:   Head: Normocephalic and atraumatic.   Eyes: Pupils are equal, round, and reactive to light. EOM are normal.   Neck: Normal range of motion. Neck supple.   Cardiovascular: Normal rate, regular rhythm, normal heart sounds and intact distal pulses.   No murmur heard.  Pulmonary/Chest: Effort normal and breath sounds normal. No respiratory distress. He has no wheezes.   Abdominal: Soft. Bowel sounds are normal.   Musculoskeletal: Normal range of motion. He exhibits no edema.   Neurological: He is alert and oriented to person, place, and time. No cranial nerve deficit.   Skin: Skin is warm and dry. Capillary refill takes less than 2 seconds.   Psychiatric: He has a normal mood and affect. His behavior is normal.   Nursing note and vitals reviewed.      Assessment:     1. Elevated liver enzymes    2. Hypertension, essential    3. Vitamin D deficiency    4. Encounter for long-term (current) use of medications    5. Atrial fibrillation, chronic    6. Hyperlipidemia, unspecified hyperlipidemia type    7. Atrial flutter, unspecified type    8. Fatty liver      MDM:   Patient is here for annual wellness exam.  Review of labs.  See other note.  I have Reviewed and summarized old records.  I have performed " thorough medication reconciliation today and discussed risk and benefits of each medication.  I have reviewed labs and discussed with patient.  All questions were answered.  I am requesting old records and will review them once they are available.    I have signed for the following orders AND/OR meds.  Orders Placed This Encounter   Procedures    Hepatic function panel     Standing Status:   Future     Standing Expiration Date:   1/24/2021           Follow up in about 1 year (around 1/24/2021), or if symptoms worsen or fail to improve, for Annual Wellness Exam.    If no improvement in symptoms or symptoms worsen, advised to call/follow-up at clinic or go to ER. Patient voiced understanding and all questions/concerns were addressed.     DISCLAIMER: This note was compiled by using a speech recognition dictation system and therefore please be aware that typographical / speech recognition errors can and do occur.  Please contact me if you see any errors specifically.    Joseph Garcia M.D.       Office: [phone]   [address] [place] [address]  FAX: [phone]

## 2020-01-24 NOTE — PATIENT INSTRUCTIONS
Follow up in about 1 year (around 1/24/2021), or if symptoms worsen or fail to improve, for Annual Wellness Exam.     If no improvement in symptoms or symptoms worsen, please be advised to call MD, follow-up at clinic and/or go to ER if becomes severe.    Joseph Garcia M.D.         We Offer TELEHEALTH & Same Day Appointments!   Book your Telehealth appointment with me through my nurse or   Clinic appointments on [employer]!    [address] [place] [address]    Office: [phone]     FAX: [phone]    Check out my Facebook Page and Follow Me at: CLICK HERE    Check out my website at [employer] by clicking on: CLICK HERE    To Schedule appointments online, go to [facility] [employer]: CLICK HERE     Location: [email or web address]

## 2020-01-24 NOTE — ASSESSMENT & PLAN NOTE
Recheck lipids in one year.  Recheck LFTs in six months.  Discussed hyperlipidemia disease course, healthy diet and increased need for exercise.  Discussed the risk of cardiovascular disease, increase stroke and heart attack risk.    Patient voiced understanding and understood the treatment plan. All questions were answered.

## 2020-02-06 ENCOUNTER — OFFICE VISIT (OUTPATIENT)
Dept: FAMILY MEDICINE | Facility: CLINIC | Age: 65
End: 2020-02-06
Payer: COMMERCIAL

## 2020-02-06 VITALS
TEMPERATURE: 98 F | WEIGHT: 195.19 LBS | HEIGHT: 69 IN | HEART RATE: 73 BPM | BODY MASS INDEX: 28.91 KG/M2 | SYSTOLIC BLOOD PRESSURE: 139 MMHG | DIASTOLIC BLOOD PRESSURE: 76 MMHG

## 2020-02-06 DIAGNOSIS — R05.9 COUGH: Primary | ICD-10-CM

## 2020-02-06 PROCEDURE — 3008F PR BODY MASS INDEX (BMI) DOCUMENTED: ICD-10-PCS | Mod: CPTII,S$GLB,, | Performed by: FAMILY MEDICINE

## 2020-02-06 PROCEDURE — 99213 PR OFFICE/OUTPT VISIT, EST, LEVL III, 20-29 MIN: ICD-10-PCS | Mod: 25,S$GLB,, | Performed by: FAMILY MEDICINE

## 2020-02-06 PROCEDURE — 3075F PR MOST RECENT SYSTOLIC BLOOD PRESS GE 130-139MM HG: ICD-10-PCS | Mod: CPTII,S$GLB,, | Performed by: FAMILY MEDICINE

## 2020-02-06 PROCEDURE — 3078F DIAST BP <80 MM HG: CPT | Mod: CPTII,S$GLB,, | Performed by: FAMILY MEDICINE

## 2020-02-06 PROCEDURE — 3008F BODY MASS INDEX DOCD: CPT | Mod: CPTII,S$GLB,, | Performed by: FAMILY MEDICINE

## 2020-02-06 PROCEDURE — 96372 PR INJECTION,THERAP/PROPH/DIAG2ST, IM OR SUBCUT: ICD-10-PCS | Mod: S$GLB,,, | Performed by: FAMILY MEDICINE

## 2020-02-06 PROCEDURE — 99999 PR PBB SHADOW E&M-EST. PATIENT-LVL IV: ICD-10-PCS | Mod: PBBFAC,,, | Performed by: FAMILY MEDICINE

## 2020-02-06 PROCEDURE — 99213 OFFICE O/P EST LOW 20 MIN: CPT | Mod: 25,S$GLB,, | Performed by: FAMILY MEDICINE

## 2020-02-06 PROCEDURE — 3078F PR MOST RECENT DIASTOLIC BLOOD PRESSURE < 80 MM HG: ICD-10-PCS | Mod: CPTII,S$GLB,, | Performed by: FAMILY MEDICINE

## 2020-02-06 PROCEDURE — 99999 PR PBB SHADOW E&M-EST. PATIENT-LVL IV: CPT | Mod: PBBFAC,,, | Performed by: FAMILY MEDICINE

## 2020-02-06 PROCEDURE — 96372 THER/PROPH/DIAG INJ SC/IM: CPT | Mod: S$GLB,,, | Performed by: FAMILY MEDICINE

## 2020-02-06 PROCEDURE — 3075F SYST BP GE 130 - 139MM HG: CPT | Mod: CPTII,S$GLB,, | Performed by: FAMILY MEDICINE

## 2020-02-06 RX ORDER — BETAMETHASONE SODIUM PHOSPHATE AND BETAMETHASONE ACETATE 3; 3 MG/ML; MG/ML
9 INJECTION, SUSPENSION INTRA-ARTICULAR; INTRALESIONAL; INTRAMUSCULAR; SOFT TISSUE
Status: COMPLETED | OUTPATIENT
Start: 2020-02-06 | End: 2020-02-06

## 2020-02-06 RX ORDER — MONTELUKAST SODIUM 10 MG/1
10 TABLET ORAL NIGHTLY
Qty: 30 TABLET | Refills: 0 | Status: SHIPPED | OUTPATIENT
Start: 2020-02-06 | End: 2020-03-07

## 2020-02-06 RX ADMIN — BETAMETHASONE SODIUM PHOSPHATE AND BETAMETHASONE ACETATE 9 MG: 3; 3 INJECTION, SUSPENSION INTRA-ARTICULAR; INTRALESIONAL; INTRAMUSCULAR; SOFT TISSUE at 10:02

## 2020-02-06 NOTE — PATIENT INSTRUCTIONS
Follow up if symptoms worsen or fail to improve.     If no improvement in symptoms or symptoms worsen, please be advised to call MD, follow-up at clinic and/or go to ER if becomes severe.    Joseph Garcia M.D.        We Offer TELEHEALTH & Same Day Appointments!   Book your Telehealth appointment with me through my nurse or   Clinic appointments on [employer]!    [address] [place] [address]    Office: [phone]   FAX: [phone]    Check out my Facebook Page and Follow Me at: [email or web address]    Check out my website at [facility] by clicking on: [email or web address]    To Schedule appointments online, go to [employer]: [email or web address]

## 2020-02-06 NOTE — PROGRESS NOTES
PLAN:      Problem List Items Addressed This Visit     Cough - Primary     Likely allergic due to seasonal changes based off a history.  Lungs are clear on exam.  Upper respiratory exam shows inflammation.  Steroid shot and start Singulair at night.  Patient advised to take antihistamine such as Zyrtec during the day.Discussed condition course and signs and symptoms to expect.  Patient advised take anti-inflammatories and or Tylenol for pain or fever.  ER precautions.  Call MD or follow-up to clinic if not improving or worsening symptoms.           Relevant Medications    montelukast (SINGULAIR) 10 mg tablet    betamethasone acetate-betamethasone sodium phosphate injection 9 mg (Completed)        Future Appointments     Date Provider Specialty Appt Notes    7/24/2020  Lab     1/25/2021 Joseph Garcia MD Family Medicine annual           Medication List with Changes/Refills   New Medications    MONTELUKAST (SINGULAIR) 10 MG TABLET    Take 1 tablet (10 mg total) by mouth every evening.   Current Medications    AMIODARONE (PACERONE) 200 MG TAB        ASPIRIN 81 MG CHEW        DIGOXIN (LANOXIN) 250 MCG TABLET        ERGOCALCIFEROL (ERGOCALCIFEROL) 50,000 UNIT CAP    Take 1 capsule (50,000 Units total) by mouth every 7 days.    EZETIMIBE (ZETIA) 10 MG TABLET        METOPROLOL TARTRATE (LOPRESSOR) 25 MG TABLET        RIVAROXABAN (XARELTO) 20 MG TAB    Take 1 tablet (20 mg total) by mouth once daily.    ROSUVASTATIN (CRESTOR) 40 MG TAB        SILDENAFIL (REVATIO) 20 MG TAB    TAKE 1 TABET BY MOUTH DAILY AS NEEDED AS DIRECTED    VITAMIN D (VITAMIN D3) 1000 UNITS TAB    Take 2,000 Units by mouth once daily.       Joseph Garcia M.D.     ==========================================================================  Subjective:      Patient ID: Johnny Sneed is a 64 y.o. male.  has a past medical history of Atrial fibrillation, Diverticulosis, Hyperlipidemia, Hypertension, and Ruptured eardrum.     Chief Complaint:  Cough (sometimes productive, sometimes non-productive)    Patient here today complaining of chronic cough.  Patient needs colonoscopy, HIV screening, PCV 23 vaccine, shingles vaccine and tetanus vaccine to satisfy health maintenance.  Problem List Items Addressed This Visit     Cough - Primary    Overview     Answers for HPI/ROS submitted by the patient on 2/6/2020   Cough  Chronicity: recurrent  Onset: in the past 7 days  Progression since onset: waxing and waning  Frequency: hourly  Cough characteristics: non-productive, productive of purulent sputum  Risk factors for lung disease: animal exposure  asthma: No  bronchiectasis: No  bronchitis: No  COPD: No  emphysema: No  pneumonia: No  Treatments tried: OTC cough suppressant; drops, cepacol   Improvement on treatment: mild  =======================================================  February 2020:  Patient states that this occurs every time the weather changes.  Denies any wheezing, chest pain or shortness of breath.  Cough is irritating and worse at night when lying flat.  Patient takes Benadryl with some relief.  Patient does not take any antihistamine.  ======================================================         Current Assessment & Plan     Likely allergic due to seasonal changes based off a history.  Lungs are clear on exam.  Upper respiratory exam shows inflammation.  Steroid shot and start Singulair at night.  Patient advised to take antihistamine such as Zyrtec during the day.Discussed condition course and signs and symptoms to expect.  Patient advised take anti-inflammatories and or Tylenol for pain or fever.  ER precautions.  Call MD or follow-up to clinic if not improving or worsening symptoms.                  Past Medical History:  Past Medical History:   Diagnosis Date    Atrial fibrillation     Diverticulosis     Hyperlipidemia     Hypertension     Ruptured eardrum      Past Surgical History:   Procedure Laterality Date    COLONOSCOPY  2015     "[facility]    CORONARY ARTERY BYPASS GRAFT  09/2014    [facility]    INNER EAR SURGERY Left 1980    NASAL SEPTUM SURGERY  03/2019    Dr Davis Obrien    PROSTATE BIOPSY  04/2011     Review of patient's allergies indicates:  No Known Allergies  Medication List with Changes/Refills   New Medications    MONTELUKAST (SINGULAIR) 10 MG TABLET    Take 1 tablet (10 mg total) by mouth every evening.   Current Medications    AMIODARONE (PACERONE) 200 MG TAB        ASPIRIN 81 MG CHEW        DIGOXIN (LANOXIN) 250 MCG TABLET        ERGOCALCIFEROL (ERGOCALCIFEROL) 50,000 UNIT CAP    Take 1 capsule (50,000 Units total) by mouth every 7 days.    EZETIMIBE (ZETIA) 10 MG TABLET        METOPROLOL TARTRATE (LOPRESSOR) 25 MG TABLET        RIVAROXABAN (XARELTO) 20 MG TAB    Take 1 tablet (20 mg total) by mouth once daily.    ROSUVASTATIN (CRESTOR) 40 MG TAB        SILDENAFIL (REVATIO) 20 MG TAB    TAKE 1 TABET BY MOUTH DAILY AS NEEDED AS DIRECTED    VITAMIN D (VITAMIN D3) 1000 UNITS TAB    Take 2,000 Units by mouth once daily.      Social History     Tobacco Use    Smoking status: Never Smoker    Smokeless tobacco: Never Used   Substance Use Topics    Alcohol use: Yes     Alcohol/week: 2.0 standard drinks     Types: 2 Glasses of wine per week     Frequency: Never      Family History   Problem Relation Age of Onset    Heart disease Mother         bypass    Cancer Father         Prostate removal    Heart disease Father         CHF    Vision loss Father         Macular degen       I have reviewed the complete PMH, social history, surgical history, allergies and medications.  As well as family history.    Review of Systems   HENT: Positive for postnasal drip, rhinorrhea and sore throat.    Respiratory: Positive for cough.    Allergic/Immunologic: Negative for environmental allergies.     Objective:   /76   Pulse 73   Temp 97.8 °F (36.6 °C) (Oral)   Ht 5' 9" (1.753 m)   Wt 88.5 kg (195 lb 3.2 oz)   " BMI 28.83 kg/m²   Physical Exam   Constitutional: He is oriented to person, place, and time. He appears well-developed and well-nourished. No distress.   HENT:   Head: Normocephalic and atraumatic.   Right Ear: Tympanic membrane is bulging. Tympanic membrane is not injected and not erythematous. A middle ear effusion is present.   Left Ear: Tympanic membrane is bulging. Tympanic membrane is not injected and not erythematous. A middle ear effusion is present.   Nose: Mucosal edema and rhinorrhea present.   Mouth/Throat: Posterior oropharyngeal erythema present. No oropharyngeal exudate or posterior oropharyngeal edema.   Eyes: Pupils are equal, round, and reactive to light. EOM are normal.   Neck: Normal range of motion. Neck supple.   Cardiovascular: Normal rate, regular rhythm, normal heart sounds and intact distal pulses.   No murmur heard.  Pulmonary/Chest: Effort normal and breath sounds normal. No stridor. No respiratory distress. He has no wheezes. He has no rales. He exhibits no tenderness.   Musculoskeletal: Normal range of motion. He exhibits no edema.   Neurological: He is alert and oriented to person, place, and time. No cranial nerve deficit.   Skin: Skin is warm and dry. Capillary refill takes less than 2 seconds.   Psychiatric: He has a normal mood and affect. His behavior is normal.   Nursing note and vitals reviewed.      Assessment:     1. Cough      MDM:     Low complexity.   - risk of corticosteroids reviewed (elevated BP/glucose, insomnia, psychosis, bone loss, etc) and patient expressed understanding    I have signed for the following orders AND/OR meds.  No orders of the defined types were placed in this encounter.    Medications Ordered This Encounter   Medications    betamethasone acetate-betamethasone sodium phosphate injection 9 mg    montelukast (SINGULAIR) 10 mg tablet     Sig: Take 1 tablet (10 mg total) by mouth every evening.     Dispense:  30 tablet     Refill:  0        Follow up  if symptoms worsen or fail to improve.    If no improvement in symptoms or symptoms worsen, advised to call/follow-up at clinic or go to ER. Patient voiced understanding and all questions/concerns were addressed.     DISCLAIMER: This note was compiled by using a speech recognition dictation system and therefore please be aware that typographical / speech recognition errors can and do occur.  Please contact me if you see any errors specifically.    Joseph Garcia M.D.       Office: [phone] [address] [place] [address]  FAX: [phone]

## 2020-02-06 NOTE — ASSESSMENT & PLAN NOTE
Likely allergic due to seasonal changes based off a history.  Lungs are clear on exam.  Upper respiratory exam shows inflammation.  Steroid shot and start Singulair at night.  Patient advised to take antihistamine such as Zyrtec during the day.Discussed condition course and signs and symptoms to expect.  Patient advised take anti-inflammatories and or Tylenol for pain or fever.  ER precautions.  Call MD or follow-up to clinic if not improving or worsening symptoms.

## 2020-07-24 ENCOUNTER — LAB VISIT (OUTPATIENT)
Dept: LAB | Facility: HOSPITAL | Age: 65
End: 2020-07-24
Attending: FAMILY MEDICINE
Payer: COMMERCIAL

## 2020-07-24 DIAGNOSIS — R74.8 ELEVATED LIVER ENZYMES: ICD-10-CM

## 2020-07-24 LAB
ALBUMIN SERPL BCP-MCNC: 3.3 G/DL (ref 3.5–5.2)
ALP SERPL-CCNC: 73 U/L (ref 55–135)
ALT SERPL W/O P-5'-P-CCNC: 56 U/L (ref 10–44)
AST SERPL-CCNC: 40 U/L (ref 10–40)
BILIRUB DIRECT SERPL-MCNC: 0.2 MG/DL (ref 0.1–0.3)
BILIRUB SERPL-MCNC: 0.5 MG/DL (ref 0.1–1)
PROT SERPL-MCNC: 6.3 G/DL (ref 6–8.4)

## 2020-07-24 PROCEDURE — 80076 HEPATIC FUNCTION PANEL: CPT

## 2020-07-24 PROCEDURE — 36415 COLL VENOUS BLD VENIPUNCTURE: CPT | Mod: PO

## 2020-07-25 DIAGNOSIS — Z79.899 ENCOUNTER FOR LONG-TERM (CURRENT) USE OF MEDICATIONS: ICD-10-CM

## 2020-07-25 DIAGNOSIS — Z13.6 ENCOUNTER FOR LIPID SCREENING FOR CARDIOVASCULAR DISEASE: ICD-10-CM

## 2020-07-25 DIAGNOSIS — Z13.220 ENCOUNTER FOR LIPID SCREENING FOR CARDIOVASCULAR DISEASE: ICD-10-CM

## 2020-07-25 DIAGNOSIS — Z00.01 ENCOUNTER FOR GENERAL ADULT MEDICAL EXAMINATION WITH ABNORMAL FINDINGS: Primary | ICD-10-CM

## 2020-07-25 NOTE — PROGRESS NOTES
Please CALL patient with results and Document verification.   [phone]  Liver function has improved.    Repeat annual wellness labs in six months.    Request PSA from Dr. Seay urologist in coming    Requests colonoscopy results.  See if patient would like to repeat colonoscopy if due.

## 2020-08-19 DIAGNOSIS — E78.5 HYPERLIPIDEMIA, UNSPECIFIED HYPERLIPIDEMIA TYPE: Primary | ICD-10-CM

## 2020-08-19 RX ORDER — EZETIMIBE 10 MG/1
10 TABLET ORAL DAILY
Qty: 90 TABLET | Refills: 3 | Status: SHIPPED | OUTPATIENT
Start: 2020-08-19 | End: 2022-02-20

## 2020-08-21 DIAGNOSIS — Z12.11 COLON CANCER SCREENING: ICD-10-CM

## 2020-09-17 ENCOUNTER — PATIENT MESSAGE (OUTPATIENT)
Dept: ADMINISTRATIVE | Facility: HOSPITAL | Age: 65
End: 2020-09-17

## 2020-10-05 ENCOUNTER — PATIENT MESSAGE (OUTPATIENT)
Dept: ADMINISTRATIVE | Facility: HOSPITAL | Age: 65
End: 2020-10-05

## 2020-10-07 DIAGNOSIS — I48.20 ATRIAL FIBRILLATION, CHRONIC: Primary | Chronic | ICD-10-CM

## 2020-10-07 NOTE — TELEPHONE ENCOUNTER
Received refill request on pended medication from [facility], medication not found on medication list, please advise.

## 2020-11-20 DIAGNOSIS — E55.9 VITAMIN D DEFICIENCY: ICD-10-CM

## 2020-11-20 RX ORDER — ERGOCALCIFEROL 1.25 MG/1
50000 CAPSULE ORAL
Qty: 4 CAPSULE | Refills: 11 | Status: SHIPPED | OUTPATIENT
Start: 2020-11-20 | End: 2022-02-20

## 2020-12-04 ENCOUNTER — PATIENT OUTREACH (OUTPATIENT)
Dept: ADMINISTRATIVE | Facility: HOSPITAL | Age: 65
End: 2020-12-04

## 2020-12-04 NOTE — PROGRESS NOTES
Working colonoscopy report; Searched Care Everywhere, [employer]. Per notes pt had colonoscopy in 2015 with [facility]. I have faxed colonoscopy request to [facility].

## 2020-12-10 RX ORDER — ROSUVASTATIN CALCIUM 40 MG/1
TABLET, COATED ORAL
Qty: 90 TABLET | Refills: 4 | Status: SHIPPED | OUTPATIENT
Start: 2020-12-10 | End: 2022-06-15 | Stop reason: SDUPTHER

## 2021-03-08 ENCOUNTER — PATIENT OUTREACH (OUTPATIENT)
Dept: ADMINISTRATIVE | Facility: HOSPITAL | Age: 66
End: 2021-03-08

## 2021-03-19 ENCOUNTER — PATIENT OUTREACH (OUTPATIENT)
Dept: ADMINISTRATIVE | Facility: HOSPITAL | Age: 66
End: 2021-03-19

## 2021-03-29 ENCOUNTER — PATIENT OUTREACH (OUTPATIENT)
Dept: ADMINISTRATIVE | Facility: HOSPITAL | Age: 66
End: 2021-03-29

## 2021-05-07 ENCOUNTER — OFFICE VISIT (OUTPATIENT)
Dept: FAMILY MEDICINE | Facility: CLINIC | Age: 66
End: 2021-05-07
Payer: COMMERCIAL

## 2021-05-07 VITALS
RESPIRATION RATE: 12 BRPM | OXYGEN SATURATION: 96 % | SYSTOLIC BLOOD PRESSURE: 129 MMHG | DIASTOLIC BLOOD PRESSURE: 74 MMHG | BODY MASS INDEX: 29.57 KG/M2 | HEIGHT: 69 IN | HEART RATE: 59 BPM | WEIGHT: 199.63 LBS | TEMPERATURE: 98 F

## 2021-05-07 DIAGNOSIS — Z23 NEED FOR VACCINATION: ICD-10-CM

## 2021-05-07 DIAGNOSIS — Z79.899 ENCOUNTER FOR LONG-TERM (CURRENT) USE OF MEDICATIONS: ICD-10-CM

## 2021-05-07 DIAGNOSIS — Z13.220 ENCOUNTER FOR LIPID SCREENING FOR CARDIOVASCULAR DISEASE: ICD-10-CM

## 2021-05-07 DIAGNOSIS — Z00.00 WELL ADULT EXAM: Primary | ICD-10-CM

## 2021-05-07 DIAGNOSIS — Z13.6 ENCOUNTER FOR LIPID SCREENING FOR CARDIOVASCULAR DISEASE: ICD-10-CM

## 2021-05-07 DIAGNOSIS — E55.9 VITAMIN D DEFICIENCY: ICD-10-CM

## 2021-05-07 DIAGNOSIS — J30.89 SEASONAL ALLERGIC RHINITIS DUE TO OTHER ALLERGIC TRIGGER: ICD-10-CM

## 2021-05-07 PROCEDURE — 3008F PR BODY MASS INDEX (BMI) DOCUMENTED: ICD-10-PCS | Mod: CPTII,S$GLB,, | Performed by: FAMILY MEDICINE

## 2021-05-07 PROCEDURE — 99396 PR PREVENTIVE VISIT,EST,40-64: ICD-10-PCS | Mod: 25,S$GLB,, | Performed by: FAMILY MEDICINE

## 2021-05-07 PROCEDURE — 99396 PREV VISIT EST AGE 40-64: CPT | Mod: 25,S$GLB,, | Performed by: FAMILY MEDICINE

## 2021-05-07 PROCEDURE — 90471 PNEUMOCOCCAL POLYSACCHARIDE VACCINE 23-VALENT =>2YO SQ IM: ICD-10-PCS | Mod: S$GLB,,, | Performed by: FAMILY MEDICINE

## 2021-05-07 PROCEDURE — 90732 PNEUMOCOCCAL POLYSACCHARIDE VACCINE 23-VALENT =>2YO SQ IM: ICD-10-PCS | Mod: S$GLB,,, | Performed by: FAMILY MEDICINE

## 2021-05-07 PROCEDURE — 90732 PPSV23 VACC 2 YRS+ SUBQ/IM: CPT | Mod: S$GLB,,, | Performed by: FAMILY MEDICINE

## 2021-05-07 PROCEDURE — 3008F BODY MASS INDEX DOCD: CPT | Mod: CPTII,S$GLB,, | Performed by: FAMILY MEDICINE

## 2021-05-07 PROCEDURE — 1126F PR PAIN SEVERITY QUANTIFIED, NO PAIN PRESENT: ICD-10-PCS | Mod: S$GLB,,, | Performed by: FAMILY MEDICINE

## 2021-05-07 PROCEDURE — 90471 IMMUNIZATION ADMIN: CPT | Mod: S$GLB,,, | Performed by: FAMILY MEDICINE

## 2021-05-07 PROCEDURE — 99999 PR PBB SHADOW E&M-EST. PATIENT-LVL V: ICD-10-PCS | Mod: PBBFAC,,, | Performed by: FAMILY MEDICINE

## 2021-05-07 PROCEDURE — 99999 PR PBB SHADOW E&M-EST. PATIENT-LVL V: CPT | Mod: PBBFAC,,, | Performed by: FAMILY MEDICINE

## 2021-05-07 PROCEDURE — 1126F AMNT PAIN NOTED NONE PRSNT: CPT | Mod: S$GLB,,, | Performed by: FAMILY MEDICINE

## 2021-05-07 RX ORDER — PREDNISONE 20 MG/1
20 TABLET ORAL DAILY
Qty: 7 TABLET | Refills: 0 | Status: SHIPPED | OUTPATIENT
Start: 2021-05-07 | End: 2021-05-14

## 2021-05-10 PROBLEM — J30.2 SEASONAL ALLERGIC RHINITIS: Status: ACTIVE | Noted: 2021-05-10

## 2021-08-03 ENCOUNTER — PATIENT MESSAGE (OUTPATIENT)
Dept: FAMILY MEDICINE | Facility: CLINIC | Age: 66
End: 2021-08-03

## 2021-08-03 DIAGNOSIS — U07.1 COVID-19: Primary | ICD-10-CM

## 2021-08-04 ENCOUNTER — INFUSION (OUTPATIENT)
Dept: INFECTIOUS DISEASES | Facility: HOSPITAL | Age: 66
End: 2021-08-04
Attending: FAMILY MEDICINE
Payer: MEDICARE

## 2021-08-04 VITALS
RESPIRATION RATE: 18 BRPM | DIASTOLIC BLOOD PRESSURE: 64 MMHG | HEART RATE: 74 BPM | OXYGEN SATURATION: 95 % | SYSTOLIC BLOOD PRESSURE: 109 MMHG | TEMPERATURE: 98 F

## 2021-08-04 DIAGNOSIS — U07.1 COVID-19: Primary | ICD-10-CM

## 2021-08-04 PROCEDURE — 25000003 PHARM REV CODE 250: Performed by: INTERNAL MEDICINE

## 2021-08-04 PROCEDURE — 63600175 PHARM REV CODE 636 W HCPCS: Performed by: INTERNAL MEDICINE

## 2021-08-04 PROCEDURE — M0243 CASIRIVI AND IMDEVI INFUSION: HCPCS | Performed by: INTERNAL MEDICINE

## 2021-08-04 RX ORDER — DIPHENHYDRAMINE HYDROCHLORIDE 50 MG/ML
25 INJECTION INTRAMUSCULAR; INTRAVENOUS ONCE AS NEEDED
Status: DISCONTINUED | OUTPATIENT
Start: 2021-08-04 | End: 2022-10-21

## 2021-08-04 RX ORDER — SODIUM CHLORIDE 0.9 % (FLUSH) 0.9 %
10 SYRINGE (ML) INJECTION
Status: DISCONTINUED | OUTPATIENT
Start: 2021-08-04 | End: 2022-10-21

## 2021-08-04 RX ORDER — ONDANSETRON 4 MG/1
4 TABLET, ORALLY DISINTEGRATING ORAL ONCE AS NEEDED
Status: DISCONTINUED | OUTPATIENT
Start: 2021-08-04 | End: 2022-10-21

## 2021-08-04 RX ORDER — EPINEPHRINE 0.3 MG/.3ML
0.3 INJECTION SUBCUTANEOUS
Status: DISCONTINUED | OUTPATIENT
Start: 2021-08-04 | End: 2022-10-21

## 2021-08-04 RX ORDER — ACETAMINOPHEN 325 MG/1
650 TABLET ORAL ONCE AS NEEDED
Status: DISCONTINUED | OUTPATIENT
Start: 2021-08-04 | End: 2022-10-21

## 2021-08-04 RX ORDER — ALBUTEROL SULFATE 90 UG/1
2 AEROSOL, METERED RESPIRATORY (INHALATION)
Status: DISCONTINUED | OUTPATIENT
Start: 2021-08-04 | End: 2022-10-21

## 2021-08-04 RX ADMIN — CASIRIVIMAB AND IMDEVIMAB 600 MG: 600; 600 INJECTION, SOLUTION, CONCENTRATE INTRAVENOUS at 11:08

## 2021-11-19 DIAGNOSIS — I48.20 ATRIAL FIBRILLATION, CHRONIC: Chronic | ICD-10-CM

## 2022-02-05 ENCOUNTER — PATIENT MESSAGE (OUTPATIENT)
Dept: FAMILY MEDICINE | Facility: CLINIC | Age: 67
End: 2022-02-05
Payer: COMMERCIAL

## 2022-02-05 ENCOUNTER — OFFICE VISIT (OUTPATIENT)
Dept: FAMILY MEDICINE | Facility: CLINIC | Age: 67
End: 2022-02-05
Payer: COMMERCIAL

## 2022-02-05 VITALS
WEIGHT: 205 LBS | TEMPERATURE: 98 F | OXYGEN SATURATION: 98 % | SYSTOLIC BLOOD PRESSURE: 134 MMHG | DIASTOLIC BLOOD PRESSURE: 82 MMHG | BODY MASS INDEX: 30.27 KG/M2

## 2022-02-05 DIAGNOSIS — J06.9 UPPER RESPIRATORY TRACT INFECTION, UNSPECIFIED TYPE: Primary | ICD-10-CM

## 2022-02-05 PROCEDURE — 1160F RVW MEDS BY RX/DR IN RCRD: CPT | Mod: CPTII,95,, | Performed by: FAMILY MEDICINE

## 2022-02-05 PROCEDURE — 3079F DIAST BP 80-89 MM HG: CPT | Mod: CPTII,95,, | Performed by: FAMILY MEDICINE

## 2022-02-05 PROCEDURE — 99213 PR OFFICE/OUTPT VISIT, EST, LEVL III, 20-29 MIN: ICD-10-PCS | Mod: 95,,, | Performed by: FAMILY MEDICINE

## 2022-02-05 PROCEDURE — 1159F MED LIST DOCD IN RCRD: CPT | Mod: CPTII,95,, | Performed by: FAMILY MEDICINE

## 2022-02-05 PROCEDURE — 3008F PR BODY MASS INDEX (BMI) DOCUMENTED: ICD-10-PCS | Mod: CPTII,95,, | Performed by: FAMILY MEDICINE

## 2022-02-05 PROCEDURE — 1160F PR REVIEW ALL MEDS BY PRESCRIBER/CLIN PHARMACIST DOCUMENTED: ICD-10-PCS | Mod: CPTII,95,, | Performed by: FAMILY MEDICINE

## 2022-02-05 PROCEDURE — 3075F PR MOST RECENT SYSTOLIC BLOOD PRESS GE 130-139MM HG: ICD-10-PCS | Mod: CPTII,95,, | Performed by: FAMILY MEDICINE

## 2022-02-05 PROCEDURE — 1159F PR MEDICATION LIST DOCUMENTED IN MEDICAL RECORD: ICD-10-PCS | Mod: CPTII,95,, | Performed by: FAMILY MEDICINE

## 2022-02-05 PROCEDURE — 3079F PR MOST RECENT DIASTOLIC BLOOD PRESSURE 80-89 MM HG: ICD-10-PCS | Mod: CPTII,95,, | Performed by: FAMILY MEDICINE

## 2022-02-05 PROCEDURE — 99213 OFFICE O/P EST LOW 20 MIN: CPT | Mod: 95,,, | Performed by: FAMILY MEDICINE

## 2022-02-05 PROCEDURE — 3008F BODY MASS INDEX DOCD: CPT | Mod: CPTII,95,, | Performed by: FAMILY MEDICINE

## 2022-02-05 PROCEDURE — 3075F SYST BP GE 130 - 139MM HG: CPT | Mod: CPTII,95,, | Performed by: FAMILY MEDICINE

## 2022-02-05 RX ORDER — METHYLPREDNISOLONE 4 MG/1
TABLET ORAL
Qty: 21 TABLET | Refills: 0 | Status: SHIPPED | OUTPATIENT
Start: 2022-02-05 | End: 2022-08-24

## 2022-02-05 RX ORDER — CODEINE PHOSPHATE AND GUAIFENESIN 10; 100 MG/5ML; MG/5ML
10 SOLUTION ORAL 4 TIMES DAILY PRN
Qty: 240 ML | Refills: 0 | Status: SHIPPED | OUTPATIENT
Start: 2022-02-05 | End: 2022-02-15

## 2022-02-05 NOTE — PROGRESS NOTES
Established Patient - Audio Only Telehealth Visit     The patient location is: home  The chief complaint leading to consultation is: upper resp problem and cough  Visit type: Virtual visit with audio only (telephone)  Total time spent with patient: 12 min       The reason for the audio only service rather than synchronous audio and video virtual visit was related to technical difficulties or patient preference/necessity.     Each patient to whom I provide medical services by telemedicine is:  (1) informed of the relationship between the physician and patient and the respective role of any other health care provider with respect to management of the patient; and (2) notified that they may decline to receive medical services by telemedicine and may withdraw from such care at any time. Patient verbally consented to receive this service via voice-only telephone call.       HPI: vitals weight 205 pound, temp 97.7, oxygen 98%, bp 134/82,   He has had mucus production noted and he has had this for 2 weeks.  He has had no fever or chills. He has been on claritin and it is not working.  He is coughing up the phlegm.  No body aches.   His taste is diminished.  No covid exposure.  He is vaccinated x 1 with Moderna.  No diarrhea or vomiting noted.  No abdominal pain noted.  No ear trouble.  He has a scratchy throat.  He has some congestion with this.  He has used saline drops.  Also uses breathe right strips at night.  He had covid and the infusion in August.       COVID Risk of Complication Score   3    1-2:Low Risk  3-6:Medium Risk  6 or greater:High Risk       Assessment and plan:  Diagnoses and all orders for this visit:    Upper respiratory tract infection, unspecified type    Other orders  -     methylPREDNISolone (MEDROL DOSEPACK) 4 mg tablet; follow package directions  -     guaiFENesin-codeine 100-10 mg/5 ml (TUSSI-ORGANIDIN NR)  mg/5 mL syrup; Take 10 mLs by mouth 4 (four) times daily as needed for  Cough.      We discussed covid testing but due to the fact that he is not having lower resp symptoms and the pulse ox is good and he is 7 days into his tx and his score is only 3, we will defer for now.   This service was not originating from a related E/M service provided within the previous 7 days nor will  to an E/M service or procedure within the next 24 hours or my soonest available appointment.  Prevailing standard of care was able to be met in this audio-only visit.

## 2022-02-19 DIAGNOSIS — E78.5 HYPERLIPIDEMIA, UNSPECIFIED HYPERLIPIDEMIA TYPE: ICD-10-CM

## 2022-02-19 DIAGNOSIS — E55.9 VITAMIN D DEFICIENCY: ICD-10-CM

## 2022-02-19 NOTE — TELEPHONE ENCOUNTER
Care Due:                  Date            Visit Type   Department     Provider  --------------------------------------------------------------------------------                                VIRTUAL      [facility] FAM[facility]  Last Visit: 02-      AUDIO ONLY   MEDICINE       Ravi Del Valle                              EP -                              PRIMARY      [facility] FAM[facility]  Next Visit: 05-      CARE ([facility])   MEDICINE       Joseph Garcia                                                            Last  Test          Frequency    Reason                     Performed    Due Date  --------------------------------------------------------------------------------    CMP.........  12 months..  rosuvastatin.............  Not Found    Overdue    Lipid Panel.  12 months..  rosuvastatin.............  Not Found    Overdue    Powered by [employer]. Reference number: [id number].   2/19/2022 12:29:16 PM CST

## 2022-02-19 NOTE — TELEPHONE ENCOUNTER
No new care gaps identified.  Powered by [employer] by "[employer]. Reference number: [id number].   2/19/2022 12:34:32 PM CST

## 2022-02-20 RX ORDER — EZETIMIBE 10 MG/1
TABLET ORAL
Qty: 90 TABLET | Refills: 4 | Status: SHIPPED | OUTPATIENT
Start: 2022-02-20 | End: 2022-08-24

## 2022-02-20 RX ORDER — ERGOCALCIFEROL 1.25 MG/1
CAPSULE ORAL
Qty: 4 CAPSULE | Refills: 4 | Status: SHIPPED | OUTPATIENT
Start: 2022-02-20 | End: 2022-08-24

## 2022-02-21 RX ORDER — EZETIMIBE 10 MG/1
10 TABLET ORAL DAILY
Qty: 90 TABLET | Refills: 3 | Status: SHIPPED | OUTPATIENT
Start: 2022-02-21 | End: 2023-02-21

## 2022-02-21 RX ORDER — ALPRAZOLAM 0.5 MG/1
0.5 TABLET ORAL 2 TIMES DAILY
COMMUNITY
Start: 2022-02-08 | End: 2022-08-24

## 2022-02-21 RX ORDER — ERGOCALCIFEROL 1.25 MG/1
50000 CAPSULE ORAL
Qty: 4 CAPSULE | Refills: 11 | Status: SHIPPED | OUTPATIENT
Start: 2022-02-21 | End: 2023-05-24 | Stop reason: SDUPTHER

## 2022-03-27 ENCOUNTER — PATIENT MESSAGE (OUTPATIENT)
Dept: FAMILY MEDICINE | Facility: CLINIC | Age: 67
End: 2022-03-27
Payer: COMMERCIAL

## 2022-05-31 ENCOUNTER — PATIENT MESSAGE (OUTPATIENT)
Dept: FAMILY MEDICINE | Facility: CLINIC | Age: 67
End: 2022-05-31
Payer: MEDICARE

## 2022-06-15 RX ORDER — AZILSARTAN KAMEDOXOMIL 40 MG/1
1 TABLET ORAL DAILY
COMMUNITY
Start: 2022-06-07

## 2022-06-15 RX ORDER — AZELASTINE 1 MG/ML
2 SPRAY, METERED NASAL 2 TIMES DAILY
COMMUNITY
Start: 2022-05-02

## 2022-06-15 RX ORDER — PANTOPRAZOLE SODIUM 40 MG/1
TABLET, DELAYED RELEASE ORAL
COMMUNITY
Start: 2022-03-25 | End: 2022-08-24

## 2022-06-15 NOTE — TELEPHONE ENCOUNTER
Care Due:                  Date            Visit Type   Department     Provider  --------------------------------------------------------------------------------                                VIRTUAL      [facility] FAM[facility]  Last Visit: 02-      AUDIO ONLY   MEDICINE       Ravi Del Valle  Next Visit: None Scheduled  None         None Found                                                            Last  Test          Frequency    Reason                     Performed    Due Date  --------------------------------------------------------------------------------    CMP.........  12 months..  ezetimibe, rosuvastatin..  Not Found    Overdue    Lipid Panel.  12 months..  ezetimibe, rosuvastatin..  Not Found    Overdue    [employer] Embedded Care Gaps. Reference number: [id number]. 6/15/2022   2:59:57 PM CDT

## 2022-06-16 RX ORDER — ROSUVASTATIN CALCIUM 40 MG/1
40 TABLET, COATED ORAL DAILY
Qty: 90 TABLET | Refills: 4 | Status: SHIPPED | OUTPATIENT
Start: 2022-06-16 | End: 2023-06-22 | Stop reason: SDUPTHER

## 2022-08-24 ENCOUNTER — OFFICE VISIT (OUTPATIENT)
Dept: FAMILY MEDICINE | Facility: CLINIC | Age: 67
End: 2022-08-24
Payer: MEDICARE

## 2022-08-24 ENCOUNTER — PATIENT MESSAGE (OUTPATIENT)
Dept: ORTHOPEDICS | Facility: CLINIC | Age: 67
End: 2022-08-24
Payer: MEDICARE

## 2022-08-24 VITALS
HEART RATE: 67 BPM | SYSTOLIC BLOOD PRESSURE: 132 MMHG | DIASTOLIC BLOOD PRESSURE: 80 MMHG | BODY MASS INDEX: 29.18 KG/M2 | HEIGHT: 69 IN | OXYGEN SATURATION: 96 % | WEIGHT: 197 LBS

## 2022-08-24 DIAGNOSIS — M25.532 LEFT WRIST PAIN: ICD-10-CM

## 2022-08-24 DIAGNOSIS — M25.561 ACUTE PAIN OF RIGHT KNEE: ICD-10-CM

## 2022-08-24 DIAGNOSIS — Z09 HOSPITAL DISCHARGE FOLLOW-UP: Primary | ICD-10-CM

## 2022-08-24 DIAGNOSIS — M25.569 KNEE PAIN, UNSPECIFIED CHRONICITY, UNSPECIFIED LATERALITY: Primary | ICD-10-CM
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
## 2022-08-24 PROCEDURE — 99999 PR PBB SHADOW E&M-EST. PATIENT-LVL V: ICD-10-PCS | Mod: PBBFAC,,, | Performed by: NURSE PRACTITIONER

## 2022-08-24 PROCEDURE — 99214 PR OFFICE/OUTPT VISIT, EST, LEVL IV, 30-39 MIN: ICD-10-PCS | Mod: S$PBB,,, | Performed by: NURSE PRACTITIONER

## 2022-08-24 PROCEDURE — 99214 OFFICE O/P EST MOD 30 MIN: CPT | Mod: S$PBB,,, | Performed by: NURSE PRACTITIONER

## 2022-08-24 PROCEDURE — 99999 PR PBB SHADOW E&M-EST. PATIENT-LVL V: CPT | Mod: PBBFAC,,, | Performed by: NURSE PRACTITIONER

## 2022-08-24 PROCEDURE — 99215 OFFICE O/P EST HI 40 MIN: CPT | Mod: PBBFAC,PO | Performed by: NURSE PRACTITIONER

## 2022-08-24 RX ORDER — IPRATROPIUM BROMIDE 42 UG/1
2 SPRAY, METERED NASAL 3 TIMES DAILY
COMMUNITY
Start: 2022-07-01

## 2022-08-24 NOTE — PROGRESS NOTES
Assessment/Plan:  Problem List Items Addressed This Visit        Orthopedic    Fall    Overview     Patient presents here for hospital follow up. He reports that he went to [facility] yesterday after having a fall. External records requested. He reports that he tripped over a rug and fell forward landing on hands and knees. He has had pain to left wrist and right knee since fall. He reports that he did not hit his head during the fall. He had imaging performed which he presents with copies of in clinic today (see below). He is primarily bothered by right knee. However, reports pain level has improved. Currently 2/10. Associated symptoms include swelling and limitation in ROM. Worse with walking, standing, bending. He has been taking tylenol for the pain with relief. He was discharged home with knee brace and crutches which he has been using as instructed. He has had no back pain, neck pain, headaches, numbness, tingling, confusion, dizziness. He reports that a family friend who is a physician has ordered an MRI of right knee. He states that MRI is scheduled for this afternoon at [facility].     Ok to proceed with MRI as scheduled; have results sent to PCP and ortho  Recommend further evaluation by orthopedic specialist; appt scheduled for tomorrow  Continue Tylenol for pain relief  Proceed with knee brace and crutches as instructed during ER encounter             Other Visit Diagnoses     Hospital discharge follow-up    -  Primary    Acute pain of right knee        Relevant Orders    Ambulatory referral/consult to Orthopedics    Left wrist pain        Relevant Orders    Ambulatory referral/consult to Orthopedics        Follow up if symptoms worsen or fail to improve.  ER precautions for severe or worsening symptoms.     Raegan Pedraza NP  _____________________________________________________________________________________________________________________________________________________    Last  visit with PCP 5/2021    CC: hospital follow up     HPI: Patient is a 67-year-old male who presents in clinic today accompanied by his spouse as an established patient here for hospital follow up. He reports that he went to [facility] yesterday after having a fall. External records requested. He reports that he tripped over a rug and fell forward landing on hands and knees. He has had pain to left wrist and right knee since fall. He reports that he did not hit his head during the fall. He had imaging performed which he presents with copies of in clinic today (see below). He is primarily bothered by right knee. However, reports pain level has improved. Currently 2/10. Associated symptoms include swelling and limitation in ROM. Worse with walking, standing, bending. He has been taking tylenol for the pain with relief. He was discharged home with knee brace and crutches which he has been using as instructed. He has had no back pain, neck pain, headaches, numbness, tingling, confusion, dizziness. He reports that a family friend who is a physician has ordered an MRI of right knee. He states that MRI is scheduled for this afternoon at [facility].             Past Medical History:  Past Medical History:   Diagnosis Date    Atrial fibrillation     Diverticulosis     Hyperlipidemia     Hypertension     Ruptured eardrum      Past Surgical History:   Procedure Laterality Date    COLONOSCOPY  2015    [facility]    CORONARY ARTERY BYPASS GRAFT  09/2014    [facility]    INNER EAR SURGERY Left 1980    NASAL SEPTUM SURGERY  03/2019    Dr Davis Obrien    PROSTATE BIOPSY  04/2011     Review of patient's allergies indicates:  No Known Allergies  Social History     Tobacco Use    Smoking status: Never Smoker    Smokeless tobacco: Never Used   Substance Use Topics    Alcohol use: Yes     Alcohol/week: 2.0 standard drinks     Types: 2 Glasses of wine per week    Drug use: Never      Family History   Problem Relation Age of Onset    Heart disease Mother         bypass    Cancer Father         Prostate removal    Heart disease Father         CHF    Vision loss Father         Macular degen     Current Outpatient Medications on File Prior to Visit   Medication Sig Dispense Refill    amiodarone (PACERONE) 200 MG Tab       aspirin 81 MG Chew       azelastine (ASTELIN) 137 mcg (0.1 %) nasal spray 2 sprays 2 (two) times daily.      EDARBI 40 mg Tab Take 1 tablet by mouth once daily.      ergocalciferol (ERGOCALCIFEROL) 50,000 unit Cap Take 1 capsule (50,000 Units total) by mouth every 7 days. 4 capsule 11    ezetimibe (ZETIA) 10 mg tablet Take 1 tablet (10 mg total) by mouth once daily. 90 tablet 3    ipratropium (ATROVENT) 42 mcg (0.06 %) nasal spray 2 sprays 3 (three) times daily.      metoprolol tartrate (LOPRESSOR) 25 MG tablet       rivaroxaban (XARELTO) 20 mg Tab Take 1 tablet (20 mg total) by mouth daily with dinner or evening meal. 90 tablet 4    rosuvastatin (CRESTOR) 40 MG Tab Take 1 tablet (40 mg total) by mouth once daily. 90 tablet 4    ALPRAZolam (XANAX) 0.5 MG tablet Take 0.5 mg by mouth 2 (two) times daily.      ezetimibe (ZETIA) 10 mg tablet TAKE ONE TABLET BY MOUTH DAILY 90 tablet 4    methylPREDNISolone (MEDROL DOSEPACK) 4 mg tablet follow package directions 21 tablet 0    pantoprazole (PROTONIX) 40 MG tablet       VITAMIN D2 1,250 mcg (50,000 unit) capsule Take 1 capsule (50,000 Units total) by mouth every 7 days. 4 capsule 4     Current Facility-Administered Medications on File Prior to Visit   Medication Dose Route Frequency Provider Last Rate Last Admin    acetaminophen tablet 650 mg  650 mg Oral Once PRN Errol Kaplan MD        albuterol inhaler 2 puff  2 puff Inhalation Q20 Min PRN Errol Kaplan MD        diphenhydrAMINE injection 25 mg  25 mg Intravenous Once PRN Errol Kaplan MD        EPINEPHrine (EPIPEN) 0.3 mg/0.3 mL pen injection 0.3  "mg  0.3 mg Intramuscular PRN Errol Kaplan MD        methylPREDNISolone sodium succinate injection 40 mg  40 mg Intravenous Once PRN Errol Kaplan MD        ondansetron disintegrating tablet 4 mg  4 mg Oral Once PRN Errol Kaplan MD        sodium chloride 0.9% 500 mL flush bag   Intravenous PRN Errol Kaplan MD        sodium chloride 0.9% flush 10 mL  10 mL Intravenous PRN Errol Kaplan MD         Review of Systems   Constitutional: Positive for activity change (recent fall). Negative for appetite change, chills, fatigue and fever.   HENT: Negative for congestion, rhinorrhea and sore throat.    Eyes: Negative for visual disturbance.   Respiratory: Negative for cough and shortness of breath.    Cardiovascular: Negative for chest pain, palpitations and leg swelling.   Gastrointestinal: Negative for abdominal pain, diarrhea and vomiting.   Genitourinary: Negative for difficulty urinating, dysuria and hematuria.   Musculoskeletal: Positive for arthralgias, gait problem and joint swelling. Negative for myalgias.   Skin: Negative for rash and wound.   Neurological: Negative for dizziness, weakness, light-headedness, numbness and headaches.   Psychiatric/Behavioral: Negative for behavioral problems. The patient is not nervous/anxious.      Vitals:    08/24/22 0836   BP: 132/80   BP Location: Right arm   Pulse: 67   SpO2: 96%   Weight: 89.4 kg (197 lb)   Height: 5' 9" (1.753 m)     Wt Readings from Last 3 Encounters:   08/24/22 89.4 kg (197 lb)   02/05/22 93 kg (205 lb)   01/04/22 90.3 kg (199 lb)     Physical Exam  Vitals reviewed.   Constitutional:       General: He is not in acute distress.     Appearance: Normal appearance. He is not ill-appearing.      Comments: Wife present   HENT:      Head: Normocephalic and atraumatic.      Right Ear: External ear normal.      Left Ear: External ear normal.   Eyes:      Extraocular Movements: Extraocular movements intact.      Conjunctiva/sclera: " Conjunctivae normal.   Cardiovascular:      Rate and Rhythm: Normal rate.      Heart sounds: Normal heart sounds.   Pulmonary:      Effort: Pulmonary effort is normal. No respiratory distress.      Breath sounds: Normal breath sounds.   Abdominal:      General: Abdomen is flat. There is no distension.   Musculoskeletal:      Left wrist: No swelling, deformity or tenderness. Normal range of motion.      Cervical back: Normal range of motion.      Right knee: Swelling present. No erythema or ecchymosis. Decreased range of motion. Tenderness present.      Left knee: Normal.      Comments: Wearing knee brace   Skin:     General: Skin is warm and dry.      Coloration: Skin is not pale.      Findings: No bruising, ecchymosis, erythema, signs of injury or rash.   Neurological:      Mental Status: He is alert and oriented to person, place, and time. Mental status is at baseline.      Gait: Gait abnormal (using crutches).   Psychiatric:         Mood and Affect: Mood normal.         Speech: Speech normal.         Behavior: Behavior normal. Behavior is cooperative.       Health Maintenance   Topic Date Due    TETANUS VACCINE  Never done    PROSTATE-SPECIFIC ANTIGEN  06/27/2020    Lipid Panel  07/08/2021    High Dose Statin  08/24/2023    Hepatitis C Screening  Completed

## 2022-08-25 ENCOUNTER — PATIENT MESSAGE (OUTPATIENT)
Dept: FAMILY MEDICINE | Facility: CLINIC | Age: 67
End: 2022-08-25
Payer: MEDICARE

## 2022-08-25 ENCOUNTER — OFFICE VISIT (OUTPATIENT)
Dept: ORTHOPEDICS | Facility: CLINIC | Age: 67
End: 2022-08-25
Payer: MEDICARE

## 2022-08-25 ENCOUNTER — HOSPITAL ENCOUNTER (OUTPATIENT)
Dept: RADIOLOGY | Facility: HOSPITAL | Age: 67
Discharge: HOME | End: 2022-08-25
Attending: NURSE PRACTITIONER
Payer: MEDICARE

## 2022-08-25 VITALS — HEIGHT: 69 IN | BODY MASS INDEX: 28.22 KG/M2 | WEIGHT: 190.5 LBS

## 2022-08-25 DIAGNOSIS — M25.561 RIGHT KNEE PAIN: ICD-10-CM

## 2022-08-25 DIAGNOSIS — S82.024A CLOSED NONDISPLACED LONGITUDINAL FRACTURE OF RIGHT PATELLA, INITIAL ENCOUNTER: Primary | ICD-10-CM

## 2022-08-25 DIAGNOSIS — M25.561 ACUTE PAIN OF RIGHT KNEE: ICD-10-CM

## 2022-08-25 DIAGNOSIS — M25.532 LEFT WRIST PAIN: ICD-10-CM

## 2022-08-25 DIAGNOSIS — M25.569 KNEE PAIN, UNSPECIFIED CHRONICITY, UNSPECIFIED LATERALITY: ICD-10-CM

## 2022-08-25 PROCEDURE — 73562 X-RAY EXAM OF KNEE 3: CPT | Mod: TC,PO,RT

## 2022-08-25 PROCEDURE — 99999 PR PBB SHADOW E&M-EST. PATIENT-LVL IV: CPT | Mod: PBBFAC,,, | Performed by: NURSE PRACTITIONER

## 2022-08-25 PROCEDURE — 73560 X-RAY EXAM OF KNEE 1 OR 2: CPT | Mod: 26,LT,, | Performed by: RADIOLOGY

## 2022-08-25 PROCEDURE — 99213 OFFICE O/P EST LOW 20 MIN: CPT | Mod: S$PBB,,, | Performed by: NURSE PRACTITIONER

## 2022-08-25 PROCEDURE — 73562 XR KNEE ORTHO RIGHT: ICD-10-PCS | Mod: 26,RT,, | Performed by: RADIOLOGY

## 2022-08-25 PROCEDURE — 73562 X-RAY EXAM OF KNEE 3: CPT | Mod: 26,RT,, | Performed by: RADIOLOGY

## 2022-08-25 PROCEDURE — 99999 PR PBB SHADOW E&M-EST. PATIENT-LVL IV: ICD-10-PCS | Mod: PBBFAC,,, | Performed by: NURSE PRACTITIONER

## 2022-08-25 PROCEDURE — 73560 XR KNEE ORTHO RIGHT: ICD-10-PCS | Mod: 26,LT,, | Performed by: RADIOLOGY

## 2022-08-25 PROCEDURE — 73560 X-RAY EXAM OF KNEE 1 OR 2: CPT | Mod: 59,TC,PO,LT

## 2022-08-25 PROCEDURE — 99214 OFFICE O/P EST MOD 30 MIN: CPT | Mod: PBBFAC,PN | Performed by: NURSE PRACTITIONER

## 2022-08-25 PROCEDURE — 99213 PR OFFICE/OUTPT VISIT, EST, LEVL III, 20-29 MIN: ICD-10-PCS | Mod: S$PBB,,, | Performed by: NURSE PRACTITIONER

## 2022-08-25 NOTE — PROGRESS NOTES
Chief Complaint   Patient presents with    Right Knee - Pain         HPI:   This is a 67 y.o. who presents to clinic today complaining of left knee pain for 3 days after tripping on concrete. States initially he had pain and almost couldn't walk, but then after a few minutes he was able to but once he rested again, he would have difficulty walking again. He has had significant anterior knee swelling and pain. Was seen by ED who placed him in a knee immobilizer. He has been icing the knee and the pain and swelling has improved. He is on Xarelto so he has been taking tylenol prn. No numbness or tingling. No associated signs or symptoms. He has also had MRI completed.      Past Medical History:   Diagnosis Date    Atrial fibrillation     Diverticulosis     Hyperlipidemia     Hypertension     Ruptured eardrum      Past Surgical History:   Procedure Laterality Date    COLONOSCOPY  2015    [facility]    CORONARY ARTERY BYPASS GRAFT  09/2014    [facility]    INNER EAR SURGERY Left 1980    NASAL SEPTUM SURGERY  03/2019    Dr Davis Obrien    PROSTATE BIOPSY  04/2011     Current Outpatient Medications on File Prior to Visit   Medication Sig Dispense Refill    amiodarone (PACERONE) 200 MG Tab       aspirin 81 MG Chew       azelastine (ASTELIN) 137 mcg (0.1 %) nasal spray 2 sprays 2 (two) times daily.      EDARBI 40 mg Tab Take 1 tablet by mouth once daily.      ergocalciferol (ERGOCALCIFEROL) 50,000 unit Cap Take 1 capsule (50,000 Units total) by mouth every 7 days. 4 capsule 11    ezetimibe (ZETIA) 10 mg tablet Take 1 tablet (10 mg total) by mouth once daily. 90 tablet 3    ipratropium (ATROVENT) 42 mcg (0.06 %) nasal spray 2 sprays 3 (three) times daily.      metoprolol tartrate (LOPRESSOR) 25 MG tablet       rivaroxaban (XARELTO) 20 mg Tab Take 1 tablet (20 mg total) by mouth daily with dinner or evening meal. 90 tablet 4    rosuvastatin (CRESTOR) 40 MG Tab Take 1 tablet (40 mg  total) by mouth once daily. 90 tablet 4     Current Facility-Administered Medications on File Prior to Visit   Medication Dose Route Frequency Provider Last Rate Last Admin    acetaminophen tablet 650 mg  650 mg Oral Once PRN Errol Kaplan MD        albuterol inhaler 2 puff  2 puff Inhalation Q20 Min PRN Errol Kaplan MD        diphenhydrAMINE injection 25 mg  25 mg Intravenous Once PRN Errol Kaplan MD        EPINEPHrine (EPIPEN) 0.3 mg/0.3 mL pen injection 0.3 mg  0.3 mg Intramuscular PRN Errol Kaplan MD        methylPREDNISolone sodium succinate injection 40 mg  40 mg Intravenous Once PRN Errol Kaplan MD        ondansetron disintegrating tablet 4 mg  4 mg Oral Once PRN Errol Kaplan MD        sodium chloride 0.9% 500 mL flush bag   Intravenous PRN Errol Kaplan MD        sodium chloride 0.9% flush 10 mL  10 mL Intravenous PRN Errol Kaplan MD         Review of patient's allergies indicates:  No Known Allergies  Family History   Problem Relation Age of Onset    Heart disease Mother         bypass    Cancer Father         Prostate removal    Heart disease Father         CHF    Vision loss Father         Macular degen     Social History     Socioeconomic History    Marital status:    Tobacco Use    Smoking status: Never Smoker    Smokeless tobacco: Never Used   Substance and Sexual Activity    Alcohol use: Yes     Alcohol/week: 2.0 standard drinks     Types: 2 Glasses of wine per week    Drug use: Never    Sexual activity: Not Currently     Partners: Female     Birth control/protection: Abstinence, Coitus interruptus, Condom       Review of Systems:  Constitutional:  Denies fever or chills   Eyes:  Denies change in visual acuity   HENT:  Denies nasal congestion or sore throat   Respiratory:  Denies cough or shortness of breath   Cardiovascular:  Denies chest pain or edema   GI:  Denies abdominal pain, nausea, vomiting, bloody stools or diarrhea   :  Denies  dysuria   Integument:  Denies rash   Neurologic:  Denies headache, focal weakness or sensory changes   Endocrine:  Denies polyuria or polydipsia   Lymphatic:  Denies swollen glands   Psychiatric:  Denies depression or anxiety     Physical Exam:   Constitutional:  Well developed, well nourished, no acute distress, non-toxic appearance   Integument:  Well hydrated, no rash   Lymphatic:  No lymphadenopathy noted   Neurologic:  Alert & oriented x 3, CN 2-12 normal, normal motor function, normal sensory function, no focal deficits noted   Psychiatric:  Speech and behavior appropriate   Eyes: EOMI  Gi: abdomen soft    Bilateral Knee Exam    right Knee Exam     Tenderness   The patient is experiencing tenderness in the anterior joint line.    Range of Motion   Extension: abnormal   Flexion: abnormal     Muscle Strength     The patient has normal knee strength.     Tests   Jael:  Medial - positive   Lachman:  Anterior - negative      Varus: negative  Valgus: negative  Patellar Apprehension: negative    Other   Erythema: absent  Sensation: normal  Pulse: present  Swelling: mild      left Knee Exam   left knee exam performed same as contralateral side and is normal.            X-rays were performed, personally reviewed by me and findings discussed with the patient.  3 views of the right knee show hemarthrosis and probable acute/recent sagittal oblique fracture of the right lateral patellar facet.      Closed nondisplaced longitudinal fracture of right patella, initial encounter    Acute pain of right knee  -     Ambulatory referral/consult to Orthopedics    Left wrist pain  -     Ambulatory referral/consult to Orthopedics      Discussed with MD John. Will place pt in recovery brace in locked position and he will follow up with John YOU in 2 weeks to review Mri and see if pt can be taken out of locked position on recovery brace.       Answers for HPI/ROS submitted by the patient on 8/24/2022  unexpected weight change:  No  appetite change : No  sleep disturbance: No  IMMUNOCOMPROMISED: No  nervous/ anxious: No  dysphoric mood: No  rash: No  visual disturbance: No  eye redness: No  eye pain: No  ear pain: No  tinnitus: No  hearing loss: No  sinus pressure : No  nosebleeds: No  enviro allergies: No  food allergies: No  cough: No  shortness of breath: No  sweating: No  frequency: No  difficulty urinating: No  hematuria: No  chest pain: No  palpitations: No  nausea: No  vomiting: No  diarrhea: No  blood in stool: No  constipation: No  headaches: No  dizziness: No  numbness: No  seizures: No  joint swelling: Yes  myalgia: No  weakness: No  back pain: No  Pain Chronicity: new  History of trauma: No  Onset: in the past 7 days  Frequency: constantly  Progression since onset: rapidly improving  Injury mechanism: falling  injury location: at work  pain- numeric: 1/10  pain location: right knee  pain quality: dull, tightness  Radiating Pain: No  Aggravating factors: bending  fever: No  inability to bear weight: Yes  itching: No  joint locking: No  limited range of motion: Yes  stiffness: Yes  tingling: No  Treatments tried: brace/corset, cold, NSAIDs  physical therapy: not tried  Improvement on treatment: moderate

## 2022-08-25 NOTE — LETTER
August 25, 2022    Johnny Sneed  [id number]  [place] [address]         [facility]  [address]  [place] [address]  Phone: [phone] August 25, 2022     Patient: Johnny Sneed   YOB: 1955   Date of Visit: 8/25/2022       To Whom It May Concern:    It is my medical opinion that Johnny Sneed should remain out of work until 10/6/2022.    If you have any questions or concerns, please don't hesitate to call.    Sincerely,        CHANTAL Lemos

## 2022-08-29 ENCOUNTER — PATIENT MESSAGE (OUTPATIENT)
Dept: FAMILY MEDICINE | Facility: CLINIC | Age: 67
End: 2022-08-29
Payer: MEDICARE

## 2022-09-02 ENCOUNTER — TELEPHONE (OUTPATIENT)
Dept: FAMILY MEDICINE | Facility: CLINIC | Age: 67
End: 2022-09-02
Payer: MEDICARE

## 2022-09-08 ENCOUNTER — OFFICE VISIT (OUTPATIENT)
Dept: ORTHOPEDICS | Facility: CLINIC | Age: 67
End: 2022-09-08
Payer: MEDICARE

## 2022-09-08 VITALS — HEIGHT: 69 IN | BODY MASS INDEX: 28.14 KG/M2 | WEIGHT: 190 LBS

## 2022-09-08 DIAGNOSIS — S82.024A CLOSED NONDISPLACED LONGITUDINAL FRACTURE OF RIGHT PATELLA, INITIAL ENCOUNTER: Primary | ICD-10-CM

## 2022-09-08 PROCEDURE — 99999 PR PBB SHADOW E&M-EST. PATIENT-LVL III: CPT | Mod: PBBFAC,,, | Performed by: ORTHOPAEDIC SURGERY

## 2022-09-08 PROCEDURE — 99213 OFFICE O/P EST LOW 20 MIN: CPT | Mod: PBBFAC,PN | Performed by: ORTHOPAEDIC SURGERY

## 2022-09-08 PROCEDURE — 99204 OFFICE O/P NEW MOD 45 MIN: CPT | Mod: S$PBB,57,, | Performed by: ORTHOPAEDIC SURGERY

## 2022-09-08 PROCEDURE — 99999 PR PBB SHADOW E&M-EST. PATIENT-LVL III: ICD-10-PCS | Mod: PBBFAC,,, | Performed by: ORTHOPAEDIC SURGERY

## 2022-09-08 PROCEDURE — 99204 PR OFFICE/OUTPT VISIT, NEW, LEVL IV, 45-59 MIN: ICD-10-PCS | Mod: S$PBB,57,, | Performed by: ORTHOPAEDIC SURGERY

## 2022-09-08 PROCEDURE — 27520 PR CLOSED RX PATELLA FX: ICD-10-PCS | Mod: S$PBB,RT,, | Performed by: ORTHOPAEDIC SURGERY

## 2022-09-08 PROCEDURE — 27520 TREAT KNEECAP FRACTURE: CPT | Mod: S$PBB,RT,, | Performed by: ORTHOPAEDIC SURGERY

## 2022-09-08 PROCEDURE — 27520 TREAT KNEECAP FRACTURE: CPT | Mod: PBBFAC,PN | Performed by: ORTHOPAEDIC SURGERY

## 2022-09-11 NOTE — PROGRESS NOTES
Chief Complaint   Patient presents with    Right Knee - Follow-up     MRI Results       HPI:    This is a 67 y.o. who presents today complaining of right knee pain for 2 weeks after fall. Pain is dull. No numbness or tingling. No associated signs or symptoms.      Past Medical History:   Diagnosis Date    Atrial fibrillation     Diverticulosis     Hyperlipidemia     Hypertension     Ruptured eardrum       Past Surgical History:   Procedure Laterality Date    COLONOSCOPY  2015    [facility]    CORONARY ARTERY BYPASS GRAFT  09/2014    [facility]    INNER EAR SURGERY Left 1980    NASAL SEPTUM SURGERY  03/2019    Dr Davis Obrien    PROSTATE BIOPSY  04/2011      Current Outpatient Medications on File Prior to Visit   Medication Sig Dispense Refill    amiodarone (PACERONE) 200 MG Tab       aspirin 81 MG Chew       azelastine (ASTELIN) 137 mcg (0.1 %) nasal spray 2 sprays 2 (two) times daily.      EDARBI 40 mg Tab Take 1 tablet by mouth once daily.      ergocalciferol (ERGOCALCIFEROL) 50,000 unit Cap Take 1 capsule (50,000 Units total) by mouth every 7 days. 4 capsule 11    ezetimibe (ZETIA) 10 mg tablet Take 1 tablet (10 mg total) by mouth once daily. 90 tablet 3    ipratropium (ATROVENT) 42 mcg (0.06 %) nasal spray 2 sprays 3 (three) times daily.      metoprolol tartrate (LOPRESSOR) 25 MG tablet       rivaroxaban (XARELTO) 20 mg Tab Take 1 tablet (20 mg total) by mouth daily with dinner or evening meal. 90 tablet 4    rosuvastatin (CRESTOR) 40 MG Tab Take 1 tablet (40 mg total) by mouth once daily. 90 tablet 4     Current Facility-Administered Medications on File Prior to Visit   Medication Dose Route Frequency Provider Last Rate Last Admin    acetaminophen tablet 650 mg  650 mg Oral Once PRN Errol Kaplan MD        albuterol inhaler 2 puff  2 puff Inhalation Q20 Min PRN Errol Kaplan MD        diphenhydrAMINE injection 25 mg  25 mg Intravenous Once PRN Errol Kaplan MD         EPINEPHrine (EPIPEN) 0.3 mg/0.3 mL pen injection 0.3 mg  0.3 mg Intramuscular PRN Errol Kaplan MD        methylPREDNISolone sodium succinate injection 40 mg  40 mg Intravenous Once PRN Errol Kaplan MD        ondansetron disintegrating tablet 4 mg  4 mg Oral Once PRN Errol Kaplan MD        sodium chloride 0.9% 500 mL flush bag   Intravenous PRN Errol Kaplan MD        sodium chloride 0.9% flush 10 mL  10 mL Intravenous PRN Errol Kaplan MD          Review of patient's allergies indicates:  No Known Allergies   Family History not pertinent   Social History     Socioeconomic History    Marital status:    Tobacco Use    Smoking status: Never    Smokeless tobacco: Never   Substance and Sexual Activity    Alcohol use: Yes     Alcohol/week: 2.0 standard drinks     Types: 2 Glasses of wine per week    Drug use: Never    Sexual activity: Not Currently     Partners: Female     Birth control/protection: Abstinence, Coitus interruptus, Condom         Review of Systems:   Constitutional:  Denies fever or chills    Eyes:  Denies change in visual acuity    HENT:  Denies nasal congestion or sore throat    Respiratory:  Denies cough or shortness of breath    Cardiovascular:  Denies chest pain or edema    GI:  Denies abdominal pain, nausea, vomiting, bloody stools or diarrhea    :  Denies dysuria    Integument:  Denies rash    Neurologic:  Denies headache, focal weakness or sensory changes    Endocrine:  Denies polyuria or polydipsia    Lymphatic:  Denies swollen glands    Psychiatric:  Denies depression or anxiety       Physical Exam:    Constitutional:  Well developed, well nourished, no acute distress, non-toxic appearance    Integument:  Well hydrated, no rash    Lymphatic:  No lymphadenopathy noted    Neurologic:  Alert & oriented x 3,     Psychiatric:  Speech and behavior appropriate    Gi: abdomen soft  Eyes: EOMI   L knee  Exam performed same as contralateral side and is normal  R knee  Able to  to a SLR. Mild point TTP about the lateral facet patella. Skin intact. Compartments soft. No effusion. NVI distally.      X-rays were performed today, personally reviewed by me and findings discussed with the patient.   3 views of the right knee as well as MRI show longitudinal patella fracture       Closed nondisplaced longitudinal fracture of right patella, initial encounter      Will treat nonop. WBAT in brace as instructed. RTC 2 weeks repeat xrays.

## 2022-10-04 DIAGNOSIS — S82.024A CLOSED NONDISPLACED LONGITUDINAL FRACTURE OF RIGHT PATELLA, INITIAL ENCOUNTER: Primary | ICD-10-CM

## 2022-10-06 ENCOUNTER — OFFICE VISIT (OUTPATIENT)
Dept: ORTHOPEDICS | Facility: CLINIC | Age: 67
End: 2022-10-06
Payer: MEDICARE

## 2022-10-06 ENCOUNTER — HOSPITAL ENCOUNTER (OUTPATIENT)
Dept: RADIOLOGY | Facility: HOSPITAL | Age: 67
Discharge: HOME | End: 2022-10-06
Attending: ORTHOPAEDIC SURGERY
Payer: MEDICARE

## 2022-10-06 VITALS — WEIGHT: 190 LBS | HEIGHT: 69 IN | BODY MASS INDEX: 28.14 KG/M2

## 2022-10-06 DIAGNOSIS — S82.024D CLOSED NONDISPLACED LONGITUDINAL FRACTURE OF RIGHT PATELLA WITH ROUTINE HEALING, SUBSEQUENT ENCOUNTER: Primary | ICD-10-CM

## 2022-10-06 DIAGNOSIS — S82.024A CLOSED NONDISPLACED LONGITUDINAL FRACTURE OF RIGHT PATELLA, INITIAL ENCOUNTER: ICD-10-CM

## 2022-10-06 PROCEDURE — 73562 X-RAY EXAM OF KNEE 3: CPT | Mod: 26,RT,, | Performed by: RADIOLOGY

## 2022-10-06 PROCEDURE — 73560 X-RAY EXAM OF KNEE 1 OR 2: CPT | Mod: 26,LT,, | Performed by: RADIOLOGY

## 2022-10-06 PROCEDURE — 73562 XR KNEE ORTHO RIGHT: ICD-10-PCS | Mod: 26,RT,, | Performed by: RADIOLOGY

## 2022-10-06 PROCEDURE — 73560 X-RAY EXAM OF KNEE 1 OR 2: CPT | Mod: TC,PO,LT

## 2022-10-06 PROCEDURE — 99024 POSTOP FOLLOW-UP VISIT: CPT | Mod: POP,,, | Performed by: ORTHOPAEDIC SURGERY

## 2022-10-06 PROCEDURE — 73560 XR KNEE ORTHO RIGHT: ICD-10-PCS | Mod: 26,LT,, | Performed by: RADIOLOGY

## 2022-10-06 PROCEDURE — 99999 PR PBB SHADOW E&M-EST. PATIENT-LVL III: CPT | Mod: PBBFAC,,, | Performed by: ORTHOPAEDIC SURGERY

## 2022-10-06 PROCEDURE — 99213 OFFICE O/P EST LOW 20 MIN: CPT | Mod: PBBFAC,PN | Performed by: ORTHOPAEDIC SURGERY

## 2022-10-06 PROCEDURE — 99024 PR POST-OP FOLLOW-UP VISIT: ICD-10-PCS | Mod: POP,,, | Performed by: ORTHOPAEDIC SURGERY

## 2022-10-06 PROCEDURE — 99999 PR PBB SHADOW E&M-EST. PATIENT-LVL III: ICD-10-PCS | Mod: PBBFAC,,, | Performed by: ORTHOPAEDIC SURGERY

## 2022-10-06 NOTE — PROGRESS NOTES
Chief Complaint   Patient presents with    Right Knee - Pain, Follow-up         HPI:  67 y.o. returns to clinic today status post non-operative treatment of a  right patella fracture 6 weeks ago. Pain is mild. Patient is compliant most of the time with restrictions.     R knee  FROM with no pain. No point TTP noted. 4/5 quad. Skin intact. Compartments soft. NVI distally.     X-rays were performed today, personally reviewed by me and findings discussed with the patient.  3 views of the right knee show healed fracture in good position    Closed nondisplaced longitudinal fracture of right patella with routine healing, subsequent encounter      Wean from brace. WBAT. RTC 1 month

## 2022-10-10 ENCOUNTER — PATIENT MESSAGE (OUTPATIENT)
Dept: FAMILY MEDICINE | Facility: CLINIC | Age: 67
End: 2022-10-10
Payer: MEDICARE

## 2022-10-19 ENCOUNTER — LAB VISIT (OUTPATIENT)
Dept: LAB | Facility: HOSPITAL | Age: 67
End: 2022-10-19
Attending: FAMILY MEDICINE
Payer: MEDICARE

## 2022-10-19 DIAGNOSIS — Z13.220 ENCOUNTER FOR LIPID SCREENING FOR CARDIOVASCULAR DISEASE: ICD-10-CM

## 2022-10-19 DIAGNOSIS — Z79.899 ENCOUNTER FOR LONG-TERM (CURRENT) USE OF MEDICATIONS: ICD-10-CM

## 2022-10-19 DIAGNOSIS — Z13.6 ENCOUNTER FOR LIPID SCREENING FOR CARDIOVASCULAR DISEASE: ICD-10-CM

## 2022-10-19 DIAGNOSIS — Z00.00 WELL ADULT EXAM: ICD-10-CM

## 2022-10-19 PROCEDURE — 80053 COMPREHEN METABOLIC PANEL: CPT | Performed by: FAMILY MEDICINE

## 2022-10-19 PROCEDURE — 36415 COLL VENOUS BLD VENIPUNCTURE: CPT | Mod: PO | Performed by: FAMILY MEDICINE

## 2022-10-19 PROCEDURE — 84443 ASSAY THYROID STIM HORMONE: CPT | Performed by: FAMILY MEDICINE

## 2022-10-19 PROCEDURE — 85027 COMPLETE CBC AUTOMATED: CPT | Performed by: FAMILY MEDICINE

## 2022-10-19 PROCEDURE — 80061 LIPID PANEL: CPT | Performed by: FAMILY MEDICINE

## 2022-10-19 PROCEDURE — 83036 HEMOGLOBIN GLYCOSYLATED A1C: CPT | Performed by: FAMILY MEDICINE

## 2022-10-20 LAB
ALBUMIN SERPL BCP-MCNC: 4 G/DL (ref 3.5–5.2)
ALP SERPL-CCNC: 74 U/L (ref 55–135)
ALT SERPL W/O P-5'-P-CCNC: 9 U/L (ref 10–44)
ANION GAP SERPL CALC-SCNC: 9 MMOL/L (ref 8–16)
AST SERPL-CCNC: 18 U/L (ref 10–40)
BILIRUB SERPL-MCNC: 0.8 MG/DL (ref 0.1–1)
BUN SERPL-MCNC: 22 MG/DL (ref 8–23)
CALCIUM SERPL-MCNC: 9.5 MG/DL (ref 8.7–10.5)
CHLORIDE SERPL-SCNC: 106 MMOL/L (ref 95–110)
CHOLEST SERPL-MCNC: 136 MG/DL (ref 120–199)
CHOLEST/HDLC SERPL: 2.8 {RATIO} (ref 2–5)
CO2 SERPL-SCNC: 23 MMOL/L (ref 23–29)
CREAT SERPL-MCNC: 0.9 MG/DL (ref 0.5–1.4)
ERYTHROCYTE [DISTWIDTH] IN BLOOD BY AUTOMATED COUNT: 14.3 % (ref 11.5–14.5)
EST. GFR  (NO RACE VARIABLE): >60 ML/MIN/1.73 M^2
ESTIMATED AVG GLUCOSE: 108 MG/DL (ref 68–131)
GLUCOSE SERPL-MCNC: 84 MG/DL (ref 70–110)
HBA1C MFR BLD: 5.4 % (ref 4–5.6)
HCT VFR BLD AUTO: 46.1 % (ref 40–54)
HDLC SERPL-MCNC: 49 MG/DL (ref 40–75)
HDLC SERPL: 36 % (ref 20–50)
HGB BLD-MCNC: 15 G/DL (ref 14–18)
LDLC SERPL CALC-MCNC: 75.8 MG/DL (ref 63–159)
MCH RBC QN AUTO: 31.3 PG (ref 27–31)
MCHC RBC AUTO-ENTMCNC: 32.5 G/DL (ref 32–36)
MCV RBC AUTO: 96 FL (ref 82–98)
NONHDLC SERPL-MCNC: 87 MG/DL
PLATELET # BLD AUTO: 173 K/UL (ref 150–450)
PMV BLD AUTO: 10.5 FL (ref 9.2–12.9)
POTASSIUM SERPL-SCNC: 4.1 MMOL/L (ref 3.5–5.1)
PROT SERPL-MCNC: 7 G/DL (ref 6–8.4)
RBC # BLD AUTO: 4.8 M/UL (ref 4.6–6.2)
SODIUM SERPL-SCNC: 138 MMOL/L (ref 136–145)
TRIGL SERPL-MCNC: 56 MG/DL (ref 30–150)
TSH SERPL DL<=0.005 MIU/L-ACNC: 1.21 UIU/ML (ref 0.4–4)
WBC # BLD AUTO: 6.17 K/UL (ref 3.9–12.7)

## 2022-10-21 ENCOUNTER — OFFICE VISIT (OUTPATIENT)
Dept: FAMILY MEDICINE | Facility: CLINIC | Age: 67
End: 2022-10-21
Payer: MEDICARE

## 2022-10-21 VITALS
WEIGHT: 190.94 LBS | SYSTOLIC BLOOD PRESSURE: 132 MMHG | BODY MASS INDEX: 27.34 KG/M2 | DIASTOLIC BLOOD PRESSURE: 80 MMHG | HEART RATE: 81 BPM | TEMPERATURE: 97 F | HEIGHT: 70 IN

## 2022-10-21 DIAGNOSIS — I25.10 CORONARY ARTERY DISEASE INVOLVING NATIVE CORONARY ARTERY OF NATIVE HEART WITHOUT ANGINA PECTORIS: ICD-10-CM

## 2022-10-21 DIAGNOSIS — Z13.220 ENCOUNTER FOR LIPID SCREENING FOR CARDIOVASCULAR DISEASE: ICD-10-CM

## 2022-10-21 DIAGNOSIS — Z23 FLU VACCINE NEED: ICD-10-CM

## 2022-10-21 DIAGNOSIS — L82.1 SK (SEBORRHEIC KERATOSIS): ICD-10-CM

## 2022-10-21 DIAGNOSIS — Z13.6 ENCOUNTER FOR LIPID SCREENING FOR CARDIOVASCULAR DISEASE: ICD-10-CM

## 2022-10-21 DIAGNOSIS — I48.20 ATRIAL FIBRILLATION, CHRONIC: Chronic | ICD-10-CM

## 2022-10-21 DIAGNOSIS — Z23 NEED FOR PNEUMOCOCCAL VACCINE: ICD-10-CM

## 2022-10-21 DIAGNOSIS — Z00.00 WELL ADULT EXAM: Primary | ICD-10-CM

## 2022-10-21 DIAGNOSIS — Z79.899 ENCOUNTER FOR LONG-TERM (CURRENT) USE OF MEDICATIONS: ICD-10-CM

## 2022-10-21 DIAGNOSIS — I10 HYPERTENSION, ESSENTIAL: Chronic | ICD-10-CM

## 2022-10-21 PROBLEM — I50.22 CHRONIC SYSTOLIC CHF (CONGESTIVE HEART FAILURE): Status: ACTIVE | Noted: 2022-02-11
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
## 2022-10-21 PROBLEM — Z11.59 NEED FOR HEPATITIS C SCREENING TEST: Status: RESOLVED | Noted: 2019-07-24 | Resolved: 2022-10-21

## 2022-10-21 PROBLEM — R55 SYNCOPE: Status: ACTIVE | Noted: 2018-01-13

## 2022-10-21 PROBLEM — R05.9 COUGH: Status: RESOLVED | Noted: 2020-02-06 | Resolved: 2022-10-21

## 2022-10-21 PROCEDURE — 99397 PER PM REEVAL EST PAT 65+ YR: CPT | Mod: GZ,S$PBB,, | Performed by: FAMILY MEDICINE

## 2022-10-21 PROCEDURE — 99999 PR PBB SHADOW E&M-EST. PATIENT-LVL IV: ICD-10-PCS | Mod: PBBFAC,,, | Performed by: FAMILY MEDICINE

## 2022-10-21 PROCEDURE — 99999 PR PBB SHADOW E&M-EST. PATIENT-LVL IV: CPT | Mod: PBBFAC,,, | Performed by: FAMILY MEDICINE

## 2022-10-21 PROCEDURE — 99214 OFFICE O/P EST MOD 30 MIN: CPT | Mod: PBBFAC,PO,25 | Performed by: FAMILY MEDICINE

## 2022-10-21 PROCEDURE — 99397 PR PREVENTIVE VISIT,EST,65 & OVER: ICD-10-PCS | Mod: GZ,S$PBB,, | Performed by: FAMILY MEDICINE

## 2022-10-21 PROCEDURE — 90677 PCV20 VACCINE IM: CPT | Mod: PBBFAC,PO

## 2022-10-21 PROCEDURE — G0008 ADMIN INFLUENZA VIRUS VAC: HCPCS | Mod: PBBFAC,PO

## 2022-10-21 NOTE — PROGRESS NOTES
This note is specifically for wellness visit performed today.   WELLNESS EXAM    Patient ID: Johnny Sneed is a 67 y.o. male.  has a past medical history of Atrial fibrillation, Diverticulosis, Hyperlipidemia, Hypertension, and Ruptured eardrum.   Chief Complaint:  Encounter for wellness exam    Well Adult Physical: Patient here for a comprehensive physical exam.The patient reports chronic problems.  The patient's last visit with me was on 5/7/2021.    Reviewed care team: Dr. Dawood Garcia in [place]  Dr. Ed Adams- Cardiology; [facility], Dago Corbett MD    Ortho Joseph Lopez MD  Hand Johnny Vargas MD  [facility]   GI cscope Dr. Leisa Schroeder    October 2022:  Patient reports that he is doing well.  Recently had a fractured patella that has healed up.  Following with orthopedics.  No surgery was required.    Patient's cardiac condition is stable.    Chronic. Vit d deficiency. Takes vitamin d supplement. No SE reported. Fatigue is slightly improved.   Lab Results   Component Value Date    [id number] 33 01/17/2020    [id number] 28 (L) 11/05/2019    [id number] 27 (L) 07/24/2019      Seasonal allergic rhinitis:  Follows with JAYDEN. Chronic.  Intermittent control.  Patient has regular flare ups throughout the year.  Patient is taking over-the-counter medications with good response.    Do you take any herbs or supplements that were not prescribed by a doctor? no   Are you taking calcium supplements? no   No results found for: UIBC, IRON, IRON, TRANS, TRANSFERRIN, TIBC, TIBC, LABIRON, FESATURATED   No results found for: EEITVMXD60  No results found for: FOLATE       History: Dr. Dawood Garcia in [place]  Date last PSA: No results found for: PSA followed by urologist, requesting record  No results found for: TESTOSTERONE No results found for: TESTOSTERONE, TOTALTESTOST, BIOTESTO   Colon cancer screening:  due 2025    Health Maintenance Topics with due status: Not Due       Topic Last Completion  Date    Colorectal Cancer Screening 03/18/2015    Lipid Panel 10/19/2022      ==============================================  History reviewed.   Health Maintenance Due   Topic Date Due    PROSTATE-SPECIFIC ANTIGEN  06/27/2020   Patient agree to influenza and pneumonia vaccines today.  Discussed risk and benefits.  Patient agreed.    Past Medical History:  Past Medical History:   Diagnosis Date    Atrial fibrillation     Diverticulosis     Hyperlipidemia     Hypertension     Ruptured eardrum      Past Surgical History:   Procedure Laterality Date    COLONOSCOPY  2015    [facility]    CORONARY ARTERY BYPASS GRAFT  09/2014    3 Vessel [facility]    INNER EAR SURGERY Left 1980    NASAL SEPTUM SURGERY  03/2019    Dr Davis Obrien    PROSTATE BIOPSY  04/2011     Review of patient's allergies indicates:  No Known Allergies  Current Outpatient Medications on File Prior to Visit   Medication Sig Dispense Refill    aspirin 81 MG Chew       azelastine (ASTELIN) 137 mcg (0.1 %) nasal spray 2 sprays 2 (two) times daily.      EDARBI 40 mg Tab Take 1 tablet by mouth once daily.      ergocalciferol (ERGOCALCIFEROL) 50,000 unit Cap Take 1 capsule (50,000 Units total) by mouth every 7 days. 4 capsule 11    ezetimibe (ZETIA) 10 mg tablet Take 1 tablet (10 mg total) by mouth once daily. 90 tablet 3    ipratropium (ATROVENT) 42 mcg (0.06 %) nasal spray 2 sprays 3 (three) times daily.      metoprolol tartrate (LOPRESSOR) 25 MG tablet       rivaroxaban (XARELTO) 20 mg Tab Take 1 tablet (20 mg total) by mouth daily with dinner or evening meal. 90 tablet 4    rosuvastatin (CRESTOR) 40 MG Tab Take 1 tablet (40 mg total) by mouth once daily. 90 tablet 4    [DISCONTINUED] amiodarone (PACERONE) 200 MG Tab        Current Facility-Administered Medications on File Prior to Visit   Medication Dose Route Frequency Provider Last Rate Last Admin    [DISCONTINUED] acetaminophen tablet 650 mg  650 mg Oral Once PRN Errol Kaplan MD         [DISCONTINUED] albuterol inhaler 2 puff  2 puff Inhalation Q20 Min PRN Errol Kaplan MD        [DISCONTINUED] diphenhydrAMINE injection 25 mg  25 mg Intravenous Once PRN Errol Kaplan MD        [DISCONTINUED] EPINEPHrine (EPIPEN) 0.3 mg/0.3 mL pen injection 0.3 mg  0.3 mg Intramuscular PRN Errol Kaplan MD        [DISCONTINUED] methylPREDNISolone sodium succinate injection 40 mg  40 mg Intravenous Once PRN Errol Kaplan MD        [DISCONTINUED] ondansetron disintegrating tablet 4 mg  4 mg Oral Once PRN Errol Kaplan MD        [DISCONTINUED] sodium chloride 0.9% 500 mL flush bag   Intravenous PRN Errol Kaplan MD        [DISCONTINUED] sodium chloride 0.9% flush 10 mL  10 mL Intravenous PRN Errol Kaplan MD         Social History     Socioeconomic History    Marital status:    Tobacco Use    Smoking status: Never    Smokeless tobacco: Never   Substance and Sexual Activity    Alcohol use: Yes     Alcohol/week: 2.0 standard drinks     Types: 2 Glasses of wine per week    Drug use: Never    Sexual activity: Not Currently     Partners: Female     Birth control/protection: Abstinence, Coitus interruptus, Condom     Family History   Problem Relation Age of Onset    Heart disease Mother         bypass    Cancer Father         Prostate removal    Heart disease Father         CHF    Vision loss Father         Macular degen       Review of Systems   Constitutional: Negative.  Negative for chills, fatigue, fever and unexpected weight change.   HENT:  Negative for ear pain, postnasal drip, sinus pressure and sore throat.    Eyes: Negative.  Negative for redness and visual disturbance.   Respiratory: Negative.  Negative for cough and shortness of breath.    Cardiovascular: Negative.  Negative for chest pain and palpitations.   Gastrointestinal: Negative.  Negative for nausea and vomiting.   Endocrine: Negative.  Negative for cold intolerance and heat intolerance.   Genitourinary: Negative.   Negative for difficulty urinating and hematuria.   Musculoskeletal:  Positive for arthralgias. Negative for myalgias.   Skin: Negative.  Negative for rash and wound.   Allergic/Immunologic: Negative.  Negative for environmental allergies and food allergies.   Neurological: Negative.  Negative for weakness and headaches.   Hematological: Negative.  Negative for adenopathy. Does not bruise/bleed easily.   Psychiatric/Behavioral: Negative.  Negative for sleep disturbance. The patient is not nervous/anxious.    All other systems reviewed and are negative.     Objective:     Vitals:    10/21/22 1047   BP: 132/80   Pulse: 81   Temp: 97.2 °F (36.2 °C)    Body mass index is 27.39 kg/m².  Physical Exam  Vitals and nursing note reviewed.   Constitutional:       General: He is not in acute distress.     Appearance: He is well-developed. He is not diaphoretic.   HENT:      Head: Normocephalic and atraumatic.      Right Ear: External ear normal.      Left Ear: External ear normal.      Nose: No congestion or rhinorrhea.   Eyes:      Extraocular Movements: Extraocular movements intact.      Pupils: Pupils are equal, round, and reactive to light.   Cardiovascular:      Rate and Rhythm: Normal rate. Rhythm irregular.      Pulses: Normal pulses.   Pulmonary:      Effort: Pulmonary effort is normal. No respiratory distress.      Breath sounds: Normal breath sounds.   Abdominal:      General: Bowel sounds are normal.      Palpations: Abdomen is soft.   Musculoskeletal:         General: Tenderness present. No deformity. Normal range of motion.      Cervical back: Normal range of motion and neck supple.   Skin:     General: Skin is warm and dry.      Capillary Refill: Capillary refill takes less than 2 seconds.      Findings: No rash.   Neurological:      General: No focal deficit present.      Mental Status: He is alert and oriented to person, place, and time.      Cranial Nerves: No cranial nerve deficit.      Motor: No weakness.       Gait: Gait normal.   Psychiatric:         Attention and Perception: He is attentive.         Mood and Affect: Mood normal. Mood is not anxious or depressed. Affect is not labile, blunt, angry or inappropriate.         Speech: He is communicative. Speech is not rapid and pressured, delayed, slurred or tangential.         Behavior: Behavior normal. Behavior is not agitated, slowed, aggressive, withdrawn, hyperactive or combative.         Thought Content: Thought content normal. Thought content is not paranoid or delusional. Thought content does not include homicidal or suicidal ideation. Thought content does not include homicidal or suicidal plan.         Cognition and Memory: Memory is not impaired.         Judgment: Judgment normal. Judgment is not impulsive or inappropriate.        Lab Visit on 10/19/2022   Component Date Value Ref Range Status    WBC 10/19/2022 6.17  3.90 - 12.70 K/uL Final    RBC 10/19/2022 4.80  4.60 - 6.20 M/uL Final    Hemoglobin 10/19/2022 15.0  14.0 - 18.0 g/dL Final    Hematocrit 10/19/2022 46.1  40.0 - 54.0 % Final    MCV 10/19/2022 96  82 - 98 fL Final    MCH 10/19/2022 31.3 (H)  27.0 - 31.0 pg Final    MCHC 10/19/2022 32.5  32.0 - 36.0 g/dL Final    RDW 10/19/2022 14.3  11.5 - 14.5 % Final    Platelets 10/19/2022 173  150 - 450 K/uL Final    MPV 10/19/2022 10.5  9.2 - 12.9 fL Final    TSH 10/19/2022 1.205  0.400 - 4.000 uIU/mL Final    Sodium 10/19/2022 138  136 - 145 mmol/L Final    Potassium 10/19/2022 4.1  3.5 - 5.1 mmol/L Final    Chloride 10/19/2022 106  95 - 110 mmol/L Final    CO2 10/19/2022 23  23 - 29 mmol/L Final    Glucose 10/19/2022 84  70 - 110 mg/dL Final    BUN 10/19/2022 22  8 - 23 mg/dL Final    Creatinine 10/19/2022 0.9  0.5 - 1.4 mg/dL Final    Calcium 10/19/2022 9.5  8.7 - 10.5 mg/dL Final    Total Protein 10/19/2022 7.0  6.0 - 8.4 g/dL Final    Albumin 10/19/2022 4.0  3.5 - 5.2 g/dL Final    Total Bilirubin 10/19/2022 0.8  0.1 - 1.0 mg/dL Final    Comment: For  infants and newborns, interpretation of results should be based  on gestational age, weight and in agreement with clinical  observations.    Premature Infant recommended reference ranges:  Up to 24 hours.............<8.0 mg/dL  Up to 48 hours............<12.0 mg/dL  3-5 days..................<15.0 mg/dL  6-29 days.................<15.0 mg/dL      Alkaline Phosphatase 10/19/2022 74  55 - 135 U/L Final    AST 10/19/2022 18  10 - 40 U/L Final    ALT 10/19/2022 9 (L)  10 - 44 U/L Final    Anion Gap 10/19/2022 9  8 - 16 mmol/L Final    eGFR 10/19/2022 >60.0  >60 mL/min/1.73 m^2 Final    Hemoglobin A1C 10/19/2022 5.4  4.0 - 5.6 % Final    Comment: ADA Screening Guidelines:  5.7-6.4%  Consistent with prediabetes  >or=6.5%  Consistent with diabetes    High levels of fetal hemoglobin interfere with the HbA1C  assay. Heterozygous hemoglobin variants (HbS, HgC, etc)do  not significantly interfere with this assay.   However, presence of multiple variants may affect accuracy.      Estimated Avg Glucose 10/19/2022 108  68 - 131 mg/dL Final    Cholesterol 10/19/2022 136  120 - 199 mg/dL Final    Comment: The National Cholesterol Education Program (NCEP) has set the  following guidelines (reference ranges) for Cholesterol:  Optimal.....................<200 mg/dL  Borderline High.............200-239 mg/dL  High........................> or = 240 mg/dL      Triglycerides 10/19/2022 56  30 - 150 mg/dL Final    Comment: The National Cholesterol Education Program (NCEP) has set the  following guidelines (reference values) for triglycerides:  Normal......................<150 mg/dL  Borderline High.............150-199 mg/dL  High........................200-499 mg/dL      HDL 10/19/2022 49  40 - 75 mg/dL Final    Comment: The National Cholesterol Education Program (NCEP) has set the  following guidelines (reference values) for HDL Cholesterol:  Low...............<40 mg/dL  Optimal...........>60 mg/dL      LDL Cholesterol 10/19/2022 75.8   63.0 - 159.0 mg/dL Final    Comment: The National Cholesterol Education Program (NCEP) has set the  following guidelines (reference values) for LDL Cholesterol:  Optimal.......................<130 mg/dL  Borderline High...............130-159 mg/dL  High..........................160-189 mg/dL  Very High.....................>190 mg/dL      HDL/Cholesterol Ratio 10/19/2022 36.0  20.0 - 50.0 % Final    Total Cholesterol/HDL Ratio 10/19/2022 2.8  2.0 - 5.0 Final    Non-HDL Cholesterol 10/19/2022 87  mg/dL Final    Comment: Risk category and Non-HDL cholesterol goals:  Coronary heart disease (CHD)or equivalent (10-year risk of CHD >20%):  Non-HDL cholesterol goal     <130 mg/dL  Two or more CHD risk factors and 10-year risk of CHD <= 20%:  Non-HDL cholesterol goal     <160 mg/dL  0 to 1 CHD risk factor:  Non-HDL cholesterol goal     <190 mg/dL          Assessment / Plan:    1.  Routine health exam-patient here for annual wellness exam.  Labs ordered.  Health maintenance was reviewed and ordered.  Anticipatory guidance: Don't smoke.  Healthy diet and regular exercise recommended. Vaccine recommendations discussed.  See orders.  Reviewed Anticipatory guidance, risk factor reduction interventions or counseling as needed, Complete history , physical was completed today.  Complete and thorough medication reconciliation was performed.  Discussed risks and benefits of medications.  Advised patient on orders and health maintenance.  We discussed old records and old labs if available.  Will request any records not available through epic.  Continue current medications listed on your summary sheet.  All questions were answered. Patient had no further concerns. Advised of diagnoses and plan. Follow up as planned or return sooner if symptoms persist or worsen.   Cardiac conditions are stable.  Follow-up with Cardiology.  Labs are being sent to his cardiologist per  Continue vitamin-D supplementation.  Short prednisone burst for flare  of seasonal allergic rhinitis.  Discussed condition course and signs and symptoms to expect.  Patient advised take anti-inflammatories and or Tylenol for pain or fever.  ER precautions.  Call MD or follow-up to clinic if not improving or worsening symptoms.  Seborrheic keratosis noted throughout the back and some on the chest.  Reassurance provided.  Patient to follow-up with Dermatology if having any new or concerning skin lesions.  Also recommended to see derm once yearly for routine skin check.    Fractured patella: Follow-up with orthopedics.    Follow-up with Cardiology and Urology as scheduled.    Patient reports recent cardiac testing was within normal limits.  Requesting records.    Orders Placed This Encounter   Procedures    (In Office Administered) Pneumococcal Conjugate Vaccine (20 Valent) (IM)    Influenza - Quadrivalent (Adjuvanted)          Future Appointments       Date Provider Specialty Appt Notes    10/21/2022 Joseph Garcia MD Family Medicine in [facility]  coming in early, annual check up    11/3/2022 Joseph Lopez MD Orthopedics r knee follow up           Joseph Garcia MD

## 2022-10-21 NOTE — PATIENT INSTRUCTIONS
Follow up in about 1 year (around 10/21/2023), or if symptoms worsen or fail to improve, for Annual Wellness Exam.     Dear patient,   As a result of recent federal legislation (The Federal Cures Act), you may receive lab or pathology results from your visit in your [facility] account before your physician is able to contact you. Your physician or their representative will relay the results to you with their recommendations at their soonest availability.     If no improvement in symptoms or symptoms worsen, please be advised to call MD, follow-up at clinic and/or go to ER if becomes severe.    Joseph Garcia M.D.        We Offer TELEHEALTH & Same Day Appointments!   Book your Telehealth appointment with me through my nurse or   Clinic appointments on [facility] [employer]!    [address] [place] [address]    Office: [phone]   FAX: [phone]    Check out my Facebook Page and Follow Me at: [email or web address]    Check out my website at [employer] by clicking on: [email or web address]    To Schedule appointments online, go to [facility] [employer]: [email or web address]

## 2022-11-03 ENCOUNTER — OFFICE VISIT (OUTPATIENT)
Dept: ORTHOPEDICS | Facility: CLINIC | Age: 67
End: 2022-11-03
Payer: MEDICARE

## 2022-11-03 VITALS — WEIGHT: 190 LBS | BODY MASS INDEX: 27.2 KG/M2 | HEIGHT: 70 IN

## 2022-11-03 DIAGNOSIS — S82.024D CLOSED NONDISPLACED LONGITUDINAL FRACTURE OF RIGHT PATELLA WITH ROUTINE HEALING, SUBSEQUENT ENCOUNTER: Primary | ICD-10-CM

## 2022-11-03 PROCEDURE — 99024 POSTOP FOLLOW-UP VISIT: CPT | Mod: POP,,, | Performed by: ORTHOPAEDIC SURGERY

## 2022-11-03 PROCEDURE — 99213 OFFICE O/P EST LOW 20 MIN: CPT | Mod: PBBFAC,PN | Performed by: ORTHOPAEDIC SURGERY

## 2022-11-03 PROCEDURE — 99024 PR POST-OP FOLLOW-UP VISIT: ICD-10-PCS | Mod: POP,,, | Performed by: ORTHOPAEDIC SURGERY

## 2022-11-03 PROCEDURE — 99999 PR PBB SHADOW E&M-EST. PATIENT-LVL III: CPT | Mod: PBBFAC,,, | Performed by: ORTHOPAEDIC SURGERY

## 2022-11-03 PROCEDURE — 99999 PR PBB SHADOW E&M-EST. PATIENT-LVL III: ICD-10-PCS | Mod: PBBFAC,,, | Performed by: ORTHOPAEDIC SURGERY

## 2022-11-03 RX ORDER — SILDENAFIL CITRATE 20 MG/1
20 TABLET ORAL DAILY PRN
COMMUNITY
Start: 2022-10-26

## 2022-11-03 RX ORDER — METHOCARBAMOL 750 MG/1
750 TABLET, FILM COATED ORAL 4 TIMES DAILY PRN
Qty: 44 TABLET | Refills: 3 | Status: SHIPPED | OUTPATIENT
Start: 2022-11-03 | End: 2023-02-17

## 2022-11-03 NOTE — PROGRESS NOTES
Chief Complaint   Patient presents with    Right Knee - Follow-up         HPI:  67 y.o. returns to clinic today status post non-operative treatment of a  right patella fracture 10 weeks ago. Pain is mild. Patient is compliant most of the time with restrictions.     R knee  4/5 quad. No point TTP. FROM. NVI distally.     Closed nondisplaced longitudinal fracture of right patella with routine healing, subsequent encounter    Other orders  -     methocarbamoL (ROBAXIN) 750 MG Tab; Take 1 tablet (750 mg total) by mouth 4 (four) times daily as needed.  Dispense: 44 tablet; Refill: 3      Continue as tolerated. RTC as needed.

## 2023-02-17 ENCOUNTER — PATIENT MESSAGE (OUTPATIENT)
Dept: FAMILY MEDICINE | Facility: CLINIC | Age: 68
End: 2023-02-17

## 2023-02-17 ENCOUNTER — E-VISIT (OUTPATIENT)
Dept: FAMILY MEDICINE | Facility: CLINIC | Age: 68
End: 2023-02-17
Payer: MEDICARE

## 2023-02-17 DIAGNOSIS — J02.9 SORE THROAT: Primary | ICD-10-CM

## 2023-02-17 PROCEDURE — 99421 PR E&M, ONLINE DIGIT, EST, < 7 DAYS, 5-10 MINS: ICD-10-PCS | Mod: 95,,, | Performed by: FAMILY MEDICINE

## 2023-02-17 PROCEDURE — 99421 OL DIG E/M SVC 5-10 MIN: CPT | Mod: 95,,, | Performed by: FAMILY MEDICINE

## 2023-02-17 RX ORDER — PROMETHAZINE HYDROCHLORIDE AND DEXTROMETHORPHAN HYDROBROMIDE 6.25; 15 MG/5ML; MG/5ML
5 SYRUP ORAL EVERY 4 HOURS PRN
Qty: 120 ML | Refills: 0 | Status: SHIPPED | OUTPATIENT
Start: 2023-02-17 | End: 2023-02-27

## 2023-02-17 RX ORDER — PREDNISONE 20 MG/1
20 TABLET ORAL DAILY
Qty: 5 TABLET | Refills: 0 | Status: SHIPPED | OUTPATIENT
Start: 2023-02-17 | End: 2023-02-22

## 2023-02-17 NOTE — PROGRESS NOTES
Patient ID: Johnny Sneed is a 67 y.o. male.    Chief Complaint: URI    The patient initiated a request through [employer] on 2/17/2023 for evaluation and management with a chief complaint of URI     I evaluated the questionnaire submission on 02/17/2023  .    Ohs Peq Evisit Upper Respitatory/Cough Questionnaire    2/17/2023  8:10 AM CST - Filed by Patient   Do you agree to participate in an E-Visit? Yes   If you have any of the following symptoms, please present to your local ER or call 911:  I acknowledge   What is the main issue that you would like for your doctor to address today? persistent, productive cough   Are you able to take your vital signs? No   What symptoms do you currently have?  Cough;  Runny nose;  Sore throat   Have you had a fever? No   When did your symptoms first appear? 2/13/2023   In the last two weeks, have you been in close contact with someone who has COVID-19 or the Flu? No   In the last two weeks, have you worked or volunteered in a healthcare facility or as a ? Healthcare facilities include a hospital, medical or dental clinic, long-term care facility, or nursing home No   Do you live in a long-term care facility, nursing home, group home, or homeless shelter? No   List what you have done or taken to help your symptoms. Mucinex   How severe are your symptoms? Mild   Have you taken an at home Covid test? No   Have you taken a Flu test? No   Have you been fully vaccinated for COVID? (2 Pfizer, 2 Moderna or 1 Stephon & Stephon vaccine injections) No   Have you received your first dose of the Pfizer or Moderna COVID vaccine? Yes   Have you recieved a Flu shot? Yes   When did you recieve your Flu shot? 10/14/2022   Do you have transportation to get tested for COVID if it is indicated and ordered for you at an [facility] location? Yes   Provide any information you feel is important to your history not asked above    Please attach any relevant images or files           Active  Problem List with Overview Notes    Diagnosis Date Noted    SK (seborrheic keratosis) 10/21/2022    Chronic systolic CHF (congestive heart failure) 02/11/2022    Coronary artery disease involving native coronary artery of native heart without angina pectoris 02/11/2022    Seasonal allergic rhinitis 05/10/2021    Atrial flutter 01/24/2020     New diagnosis recently found by Cardiology.  Stable medications.  No issues.      Elevated liver enzymes 01/24/2020     Patient with chronic elevation in LFTs.   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  Patient has known about the elevated liver enzymes and fatty liver for several years.  It is been monitored with routine blood work.    Patient is establish with [facility].  Reports he had a colonoscopy performed in or about 2015 and was told to return in 10 years.    Records have been requested.    Patient denies any abdominal pain nausea vomiting diarrhea.  No change in skin color.      Fatty liver 01/24/2020     US Abdomen Complete  Narrative: EXAMINATION:  US ABDOMEN COMPLETE    CLINICAL HISTORY:  elevated liver functions; Abnormal levels of other serum enzymes    TECHNIQUE:  Complete abdominal ultrasound (including pancreas, liver, gallbladder, common bile duct, spleen, aorta, IVC, and kidneys) was performed.    COMPARISON:  None    FINDINGS:  Complete abdominal ultrasound performed. The liver measures 13.6 cm in length, normal in size and is homogeneously increased in echotexture, most consistent with diffuse fatty infiltration..  Common bile duct is within normal limits at 0.62 cm.  There are 2 small stones noted within the gallbladder the largest measuring up to 6.6 mm.  No gallbladder wall thickening.  The sonographic Kitchen sign is reported as negative.  The visualized portions of  the pancreas, IVC and abdominal aorta are within normal limits.   The right kidney measures 10.3 cm. The left kidney measures 11.0 cm. No hydronephrosis or renal masses identified.  The spleen measures 9.9 cm.  Impression: 1. Diffuse fatty infiltration of the liver.  2. Cholelithiasis.  .    Electronically signed by: Caleb Fitzgerald DO  Date:    01/17/2020  Time:    09:01          Hyperlipidemia 07/24/2019     CHRONIC. STABLE. Lab analysis reviewed.   =======================================================  JANUARY 2020:  Stable on rosuvastatin 40 mg.  ======================================================  (-) CP, SOB, abdominal pain, N/V/D, constipation, jaundice, skin changes.  (-) Myalgias  Lab Results   Component Value Date    CHOL 131 01/17/2020    CHOL 121 02/05/2019     Lab Results   Component Value Date    HDL 47 01/17/2020    HDL 51 02/05/2019     Lab Results   Component Value Date    LDLCALC 71.6 01/17/2020    LDLCALC 56 02/05/2019     Lab Results   Component Value Date    TRIG 62 01/17/2020    TRIG 69 02/05/2019     Lab Results   Component Value Date    CHOLHDL 35.9 01/17/2020     Lab Results   Component Value Date    TOTALCHOLEST 2.8 01/17/2020     Lab Results   Component Value Date    ALT 93 (H) 01/17/2020    AST 84 (H) 01/17/2020    ALKPHOS 68 01/17/2020    BILITOT 0.5 01/17/2020   ======================================================        Atrial fibrillation, chronic 07/24/2019     Initial HPI:  Chronic.  Stable.  History of several cardioversions in the past.  Patient taking amiodarone digoxin metoprolol and Xarelto.  Follows up with Dr. Ed Adams.  =======================================================  JANUARY 2020:  Patient reports that he was in a flutter at recent follow-up with Cardiology.  Patient feels well.  Stable medications.  ======================================================      Encounter for long-term (current) use of medications 07/24/2019     CHRONIC. Stable. Compliant with  medications for managed conditions. See medication list. No SE reported.   Routine lab analysis is being monitored. Refills were addressed.  Lab Results   Component Value Date    WBC 5.78 07/24/2019    HGB 15.6 07/24/2019    HCT 47.9 07/24/2019    MCV 95 07/24/2019     07/24/2019       Chemistry        Component Value Date/Time     01/17/2020 0741     02/05/2019    K 4.1 01/17/2020 0741    K 5.1 02/05/2019     01/17/2020 0741     02/05/2019    CO2 28 01/17/2020 0741    CO2 31 02/05/2019    BUN 22 01/17/2020 0741    BUN 21.0 02/05/2019    CREATININE 1.0 01/17/2020 0741    CREATININE 1.2 06/27/2019    GLU 84 01/17/2020 0741        Component Value Date/Time    CALCIUM 9.2 01/17/2020 0741    CALCIUM 9.6 02/05/2019    ALKPHOS 68 01/17/2020 0741    AST 84 (H) 01/17/2020 0741    AST 31 02/05/2019    ALT 93 (H) 01/17/2020 0741    ALT 33 02/05/2019    BILITOT 0.5 01/17/2020 0741    ESTGFRAFRICA >60.0 01/17/2020 0741    EGFRNONAA >60.0 01/17/2020 0741          Lab Results   Component Value Date    TSH 1.474 07/24/2019    W9JQZCC 10.7 07/24/2019    T3FREE 2.6 07/24/2019         Vitamin D deficiency 07/24/2019 7/24/2019Vit d deficiency. Takes vitamin d supplement. No SE reported, due for level check.  ======================================================  January 2020:Chronic. Vit d deficiency. Takes vitamin d supplement. No SE reported. Fatigue is slightly improved.   Lab Results   Component Value Date    MNTUNNES28VT 33 01/17/2020    ZCKPUSEO53HU 28 (L) 11/05/2019    JMXGHYNS73NI 27 (L) 07/24/2019            Hypertension, essential 07/24/2019     Initial HPI:  Blood pressure is elevated today.  Patient reports blood pressures at home run 130/80.  He checks them with the machine.  He is going to continue checking them and let me know within numbers are.  Continue metoprolol amiodarone digoxin.  =======================================================  JANUARY 2020:  Well controlled  today.  CHRONIC. STABLE. BP Reviewed.  Compliant with BP medications. No SE reported.   (-) CP, SOB, palpitations, dizziness, lightheadedness, HA, arm numbness, tingling or weakness, syncope.  Creatinine   Date Value Ref Range Status   01/17/2020 1.0 0.5 - 1.4 mg/dL Final   06/27/2019 1.2 mg/dL Final       ======================================================      Syncope 01/13/2018      Recent Labs Obtained:  No visits with results within 7 Day(s) from this visit.   Latest known visit with results is:   Lab Visit on 10/19/2022   Component Date Value Ref Range Status    WBC 10/19/2022 6.17  3.90 - 12.70 K/uL Final    RBC 10/19/2022 4.80  4.60 - 6.20 M/uL Final    Hemoglobin 10/19/2022 15.0  14.0 - 18.0 g/dL Final    Hematocrit 10/19/2022 46.1  40.0 - 54.0 % Final    MCV 10/19/2022 96  82 - 98 fL Final    MCH 10/19/2022 31.3 (H)  27.0 - 31.0 pg Final    MCHC 10/19/2022 32.5  32.0 - 36.0 g/dL Final    RDW 10/19/2022 14.3  11.5 - 14.5 % Final    Platelets 10/19/2022 173  150 - 450 K/uL Final    MPV 10/19/2022 10.5  9.2 - 12.9 fL Final    TSH 10/19/2022 1.205  0.400 - 4.000 uIU/mL Final    Sodium 10/19/2022 138  136 - 145 mmol/L Final    Potassium 10/19/2022 4.1  3.5 - 5.1 mmol/L Final    Chloride 10/19/2022 106  95 - 110 mmol/L Final    CO2 10/19/2022 23  23 - 29 mmol/L Final    Glucose 10/19/2022 84  70 - 110 mg/dL Final    BUN 10/19/2022 22  8 - 23 mg/dL Final    Creatinine 10/19/2022 0.9  0.5 - 1.4 mg/dL Final    Calcium 10/19/2022 9.5  8.7 - 10.5 mg/dL Final    Total Protein 10/19/2022 7.0  6.0 - 8.4 g/dL Final    Albumin 10/19/2022 4.0  3.5 - 5.2 g/dL Final    Total Bilirubin 10/19/2022 0.8  0.1 - 1.0 mg/dL Final    Comment: For infants and newborns, interpretation of results should be based  on gestational age, weight and in agreement with clinical  observations.    Premature Infant recommended reference ranges:  Up to 24 hours.............<8.0 mg/dL  Up to 48 hours............<12.0 mg/dL  3-5  days..................<15.0 mg/dL  6-29 days.................<15.0 mg/dL      Alkaline Phosphatase 10/19/2022 74  55 - 135 U/L Final    AST 10/19/2022 18  10 - 40 U/L Final    ALT 10/19/2022 9 (L)  10 - 44 U/L Final    Anion Gap 10/19/2022 9  8 - 16 mmol/L Final    eGFR 10/19/2022 >60.0  >60 mL/min/1.73 m^2 Final    Hemoglobin A1C 10/19/2022 5.4  4.0 - 5.6 % Final    Comment: ADA Screening Guidelines:  5.7-6.4%  Consistent with prediabetes  >or=6.5%  Consistent with diabetes    High levels of fetal hemoglobin interfere with the HbA1C  assay. Heterozygous hemoglobin variants (HbS, HgC, etc)do  not significantly interfere with this assay.   However, presence of multiple variants may affect accuracy.      Estimated Avg Glucose 10/19/2022 108  68 - 131 mg/dL Final    Cholesterol 10/19/2022 136  120 - 199 mg/dL Final    Comment: The National Cholesterol Education Program (NCEP) has set the  following guidelines (reference ranges) for Cholesterol:  Optimal.....................<200 mg/dL  Borderline High.............200-239 mg/dL  High........................> or = 240 mg/dL      Triglycerides 10/19/2022 56  30 - 150 mg/dL Final    Comment: The National Cholesterol Education Program (NCEP) has set the  following guidelines (reference values) for triglycerides:  Normal......................<150 mg/dL  Borderline High.............150-199 mg/dL  High........................200-499 mg/dL      HDL 10/19/2022 49  40 - 75 mg/dL Final    Comment: The National Cholesterol Education Program (NCEP) has set the  following guidelines (reference values) for HDL Cholesterol:  Low...............<40 mg/dL  Optimal...........>60 mg/dL      LDL Cholesterol 10/19/2022 75.8  63.0 - 159.0 mg/dL Final    Comment: The National Cholesterol Education Program (NCEP) has set the  following guidelines (reference values) for LDL Cholesterol:  Optimal.......................<130 mg/dL  Borderline High...............130-159  mg/dL  High..........................160-189 mg/dL  Very High.....................>190 mg/dL      HDL/Cholesterol Ratio 10/19/2022 36.0  20.0 - 50.0 % Final    Total Cholesterol/HDL Ratio 10/19/2022 2.8  2.0 - 5.0 Final    Non-HDL Cholesterol 10/19/2022 87  mg/dL Final    Comment: Risk category and Non-HDL cholesterol goals:  Coronary heart disease (CHD)or equivalent (10-year risk of CHD >20%):  Non-HDL cholesterol goal     <130 mg/dL  Two or more CHD risk factors and 10-year risk of CHD <= 20%:  Non-HDL cholesterol goal     <160 mg/dL  0 to 1 CHD risk factor:  Non-HDL cholesterol goal     <190 mg/dL         Encounter Diagnosis   Name Primary?    Sore throat Yes        Orders Placed This Encounter   Procedures    POCT COVID-19 Rapid Screening     Standing Status:   Future     Standing Expiration Date:   4/17/2024     Order Specific Question:   Is the patient symptomatic?     Answer:   Yes    POCT Influenza A/B     Standing Status:   Future     Standing Expiration Date:   4/17/2024      Medications Ordered This Encounter   Medications    predniSONE (DELTASONE) 20 MG tablet     Sig: Take 1 tablet (20 mg total) by mouth once daily. for 5 days     Dispense:  5 tablet     Refill:  0    promethazine-dextromethorphan (PROMETHAZINE-DM) 6.25-15 mg/5 mL Syrp     Sig: Take 5 mLs by mouth every 4 (four) hours as needed (cough).     Dispense:  120 mL     Refill:  0        E-Visit Time Tracking:    Day 1 Time (in minutes): 7     Total Time (in minutes): 7

## 2023-02-17 NOTE — PATIENT INSTRUCTIONS
Follow up if symptoms worsen or fail to improve.     Dear patient,   As a result of recent federal legislation (The Federal Cures Act), you may receive lab or pathology results from your visit in your [facility] account before your physician is able to contact you. Your physician or their representative will relay the results to you with their recommendations at their soonest availability.     If no improvement in symptoms or symptoms worsen, please be advised to call MD, follow-up at clinic and/or go to ER if becomes severe.    Joseph Garcia M.D.        We Offer TELEHEALTH & Same Day Appointments!   Book your Telehealth appointment with me through my nurse or   Clinic appointments on [employer]!    [address] [place] [address]    Office: [phone]   FAX: [phone]    Check out my Facebook Page and Follow Me at: [email or web address]    Check out my website at [employer] by clicking on: [email or web address]    To Schedule appointments online, go to [employer]: [email or web address]

## 2023-02-20 ENCOUNTER — PATIENT MESSAGE (OUTPATIENT)
Dept: FAMILY MEDICINE | Facility: CLINIC | Age: 68
End: 2023-02-20
Payer: MEDICARE

## 2023-02-20 RX ORDER — AZITHROMYCIN 250 MG/1
TABLET, FILM COATED ORAL
Qty: 6 TABLET | Refills: 0 | Status: SHIPPED | OUTPATIENT
Start: 2023-02-20 | End: 2023-02-25

## 2023-02-21 NOTE — TELEPHONE ENCOUNTER
I received your message which was reviewed along with the the medication list and allergies that we have below.  Please review it for accuracy to make sure that we have the most recent records on your history.     Based on this, the following orders were placed AND/OR medicines were sent in.     No orders of the defined types were placed in this encounter.      Medications written and sent at this time include:  Medications Ordered This Encounter   Medications    azithromycin (Z-LALIT) 250 MG tablet     Sig: Take 2 tablets by mouth on day 1; Take 1 tablet by mouth on days 2-5     Dispense:  6 tablet     Refill:  0       Your pharmacy(ies) of choice at this time on record include the list below and any medications would have been sent to the one at the top.    [facility]  [place], [place] - [address]  [address] [facility] [address]  Phone: [phone] Fax: [phone]      Thank you for choosing us as your healthcare provider!  Dr. Joseph Garcia    ALLERGY LIST  Review of patient's allergies indicates:  No Known Allergies    MEDICATION LIST  Current Outpatient Medications on File Prior to Visit   Medication Sig Dispense Refill    aspirin 81 MG Chew       azelastine (ASTELIN) 137 mcg (0.1 %) nasal spray 2 sprays 2 (two) times daily.      EDARBI 40 mg Tab Take 1 tablet by mouth once daily.      ergocalciferol (ERGOCALCIFEROL) 50,000 unit Cap Take 1 capsule (50,000 Units total) by mouth every 7 days. 4 capsule 11    ezetimibe (ZETIA) 10 mg tablet Take 1 tablet (10 mg total) by mouth once daily. 90 tablet 3    ipratropium (ATROVENT) 42 mcg (0.06 %) nasal spray 2 sprays 3 (three) times daily.      metoprolol tartrate (LOPRESSOR) 25 MG tablet       predniSONE (DELTASONE) 20 MG tablet Take 1 tablet (20 mg total) by mouth once daily. for 5 days 5 tablet 0    promethazine-dextromethorphan (PROMETHAZINE-DM) 6.25-15 mg/5 mL Syrp Take 5 mLs by mouth every 4 (four) hours as needed (cough). 120 mL 0     rivaroxaban (XARELTO) 20 mg Tab Take 1 tablet (20 mg total) by mouth daily with dinner or evening meal. 90 tablet 4    rosuvastatin (CRESTOR) 40 MG Tab Take 1 tablet (40 mg total) by mouth once daily. 90 tablet 4    sildenafil (REVATIO) 20 mg Tab Take 20 mg by mouth daily as needed.       No current facility-administered medications on file prior to visit.       HEALTH MAINTENANCE THAT IS OVERDUE OR NEEDS TO BE UPDATED ON OUR CHART IS LISTED BELOW.  IF YOU HAVE HAD IT DONE ELSEWHERE, PLEASE SEND US DATES AND RECORDS IF YOU HAVE THEM TO MAKE YOUR CHART ACCURATE.  IF YOU HAVE NOT HAD THESE DONE AND ARE READY FOR US TO SCHEDULE THEM, PLEASE SEND US A MESSAGE.  Health Maintenance Due   Topic Date Due    PROSTATE-SPECIFIC ANTIGEN  06/27/2020       DISCLAIMER: This note was compiled by using a speech recognition dictation system and therefore please be aware that typographical / speech recognition errors can and do occur.  Please contact me if you see any errors specifically.    Joseph Garcia MD  We Offer Telehealth & Same Day Appointments!   Book your Telehealth appointment with me through my nurse or   Clinic appointments on [employer]  Gagven-[phone]     Check out my Facebook Page and Follow Me at: CLICK HERE    Check out my website at [employer] by clicking on: CLICK HERE    To Schedule appointments online, go to [employer]: CLICK HERE     Location: [email or web address]    [address] [place] [address]    FAX: [phone]

## 2023-05-24 DIAGNOSIS — E55.9 VITAMIN D DEFICIENCY: ICD-10-CM

## 2023-05-24 DIAGNOSIS — I48.20 ATRIAL FIBRILLATION, CHRONIC: Chronic | ICD-10-CM

## 2023-05-24 RX ORDER — ERGOCALCIFEROL 1.25 MG/1
50000 CAPSULE ORAL
Qty: 4 CAPSULE | Refills: 11 | Status: SHIPPED | OUTPATIENT
Start: 2023-05-24

## 2023-06-22 RX ORDER — ROSUVASTATIN CALCIUM 40 MG/1
40 TABLET, COATED ORAL DAILY
Qty: 90 TABLET | Refills: 4 | Status: SHIPPED | OUTPATIENT
Start: 2023-06-22

## 2023-06-22 NOTE — TELEPHONE ENCOUNTER
No care due was identified.  [facility] Embedded Care Due Messages. Reference number: [id number].   6/22/2023 9:24:41 AM CDT

## 2023-10-22 NOTE — PROGRESS NOTES
Patient is establish with [facility] for colonoscopy screening.  Please forward ultrasound results to them.  Patient is to follow up with them for further evaluation and monitoring of his hepatic steatosis.  I spoke with the patient this morning about the results.    Also request records of colonoscopy from [facility]  [phone]    TETANUS VACCINE due on 06/02/1974  Colonoscopy due on 06/02/2006
No

## 2024-02-27 DIAGNOSIS — Z00.00 ENCOUNTER FOR MEDICARE ANNUAL WELLNESS EXAM: ICD-10-CM

## 2024-03-29 ENCOUNTER — PATIENT MESSAGE (OUTPATIENT)
Dept: FAMILY MEDICINE | Facility: CLINIC | Age: 69
End: 2024-03-29
Payer: MEDICARE

## 2024-05-18 ENCOUNTER — PATIENT MESSAGE (OUTPATIENT)
Dept: FAMILY MEDICINE | Facility: CLINIC | Age: 69
End: 2024-05-18
Payer: MEDICARE

## 2024-05-20 ENCOUNTER — OFFICE VISIT (OUTPATIENT)
Dept: FAMILY MEDICINE | Facility: CLINIC | Age: 69
End: 2024-05-20
Payer: MEDICARE

## 2024-05-20 ENCOUNTER — LAB VISIT (OUTPATIENT)
Dept: LAB | Facility: HOSPITAL | Age: 69
End: 2024-05-20
Attending: NURSE PRACTITIONER
Payer: MEDICARE

## 2024-05-20 VITALS
HEART RATE: 73 BPM | BODY MASS INDEX: 29.3 KG/M2 | WEIGHT: 197.81 LBS | DIASTOLIC BLOOD PRESSURE: 86 MMHG | OXYGEN SATURATION: 96 % | HEIGHT: 69 IN | SYSTOLIC BLOOD PRESSURE: 138 MMHG

## 2024-05-20 DIAGNOSIS — I25.10 CORONARY ARTERY DISEASE INVOLVING NATIVE CORONARY ARTERY OF NATIVE HEART WITHOUT ANGINA PECTORIS: ICD-10-CM

## 2024-05-20 DIAGNOSIS — E78.5 HYPERLIPIDEMIA, UNSPECIFIED HYPERLIPIDEMIA TYPE: Primary | ICD-10-CM

## 2024-05-20 DIAGNOSIS — E55.9 VITAMIN D DEFICIENCY: Chronic | ICD-10-CM

## 2024-05-20 DIAGNOSIS — E29.1 TESTICULAR HYPOFUNCTION: ICD-10-CM

## 2024-05-20 DIAGNOSIS — I10 HYPERTENSION, ESSENTIAL: Chronic | ICD-10-CM

## 2024-05-20 DIAGNOSIS — L82.1 SK (SEBORRHEIC KERATOSIS): ICD-10-CM

## 2024-05-20 DIAGNOSIS — I50.22 CHRONIC SYSTOLIC CHF (CONGESTIVE HEART FAILURE): ICD-10-CM

## 2024-05-20 DIAGNOSIS — Z79.899 ENCOUNTER FOR LONG-TERM (CURRENT) USE OF MEDICATIONS: ICD-10-CM

## 2024-05-20 DIAGNOSIS — I48.20 ATRIAL FIBRILLATION, CHRONIC: Chronic | ICD-10-CM

## 2024-05-20 PROBLEM — R55 SYNCOPE: Status: RESOLVED | Noted: 2018-01-13 | Resolved: 2024-05-20

## 2024-05-20 LAB
25(OH)D3+25(OH)D2 SERPL-MCNC: 28 NG/ML (ref 30–96)
ALBUMIN SERPL BCP-MCNC: 3.8 G/DL (ref 3.5–5.2)
ALP SERPL-CCNC: 77 U/L (ref 55–135)
ALT SERPL W/O P-5'-P-CCNC: 18 U/L (ref 10–44)
ANION GAP SERPL CALC-SCNC: 10 MMOL/L (ref 8–16)
AST SERPL-CCNC: 20 U/L (ref 10–40)
BILIRUB SERPL-MCNC: 0.7 MG/DL (ref 0.1–1)
BUN SERPL-MCNC: 18 MG/DL (ref 8–23)
CALCIUM SERPL-MCNC: 9.6 MG/DL (ref 8.7–10.5)
CHLORIDE SERPL-SCNC: 106 MMOL/L (ref 95–110)
CHOLEST SERPL-MCNC: 165 MG/DL (ref 120–199)
CHOLEST/HDLC SERPL: 4 {RATIO} (ref 2–5)
CO2 SERPL-SCNC: 24 MMOL/L (ref 23–29)
CREAT SERPL-MCNC: 1.3 MG/DL (ref 0.5–1.4)
ERYTHROCYTE [DISTWIDTH] IN BLOOD BY AUTOMATED COUNT: 13.2 % (ref 11.5–14.5)
EST. GFR  (NO RACE VARIABLE): >60 ML/MIN/1.73 M^2
ESTIMATED AVG GLUCOSE: 114 MG/DL (ref 68–131)
GLUCOSE SERPL-MCNC: 98 MG/DL (ref 70–110)
HBA1C MFR BLD: 5.6 % (ref 4–5.6)
HCT VFR BLD AUTO: 54.4 % (ref 40–54)
HDLC SERPL-MCNC: 41 MG/DL (ref 40–75)
HDLC SERPL: 24.8 % (ref 20–50)
HGB BLD-MCNC: 18 G/DL (ref 14–18)
LDLC SERPL CALC-MCNC: 106.4 MG/DL (ref 63–159)
MCH RBC QN AUTO: 31.7 PG (ref 27–31)
MCHC RBC AUTO-ENTMCNC: 33.1 G/DL (ref 32–36)
MCV RBC AUTO: 96 FL (ref 82–98)
NONHDLC SERPL-MCNC: 124 MG/DL
PLATELET # BLD AUTO: 136 K/UL (ref 150–450)
PMV BLD AUTO: 11.6 FL (ref 9.2–12.9)
POTASSIUM SERPL-SCNC: 4.7 MMOL/L (ref 3.5–5.1)
PROT SERPL-MCNC: 6.9 G/DL (ref 6–8.4)
RBC # BLD AUTO: 5.67 M/UL (ref 4.6–6.2)
SODIUM SERPL-SCNC: 140 MMOL/L (ref 136–145)
TRIGL SERPL-MCNC: 88 MG/DL (ref 30–150)
TSH SERPL DL<=0.005 MIU/L-ACNC: 1.38 UIU/ML (ref 0.4–4)
WBC # BLD AUTO: 5.61 K/UL (ref 3.9–12.7)

## 2024-05-20 PROCEDURE — 83036 HEMOGLOBIN GLYCOSYLATED A1C: CPT | Performed by: NURSE PRACTITIONER

## 2024-05-20 PROCEDURE — 84443 ASSAY THYROID STIM HORMONE: CPT | Performed by: NURSE PRACTITIONER

## 2024-05-20 PROCEDURE — 99214 OFFICE O/P EST MOD 30 MIN: CPT | Mod: S$PBB,,, | Performed by: NURSE PRACTITIONER

## 2024-05-20 PROCEDURE — 85027 COMPLETE CBC AUTOMATED: CPT | Performed by: NURSE PRACTITIONER

## 2024-05-20 PROCEDURE — 99999 PR PBB SHADOW E&M-EST. PATIENT-LVL IV: CPT | Mod: PBBFAC,,, | Performed by: NURSE PRACTITIONER

## 2024-05-20 PROCEDURE — 80061 LIPID PANEL: CPT | Performed by: NURSE PRACTITIONER

## 2024-05-20 PROCEDURE — 99214 OFFICE O/P EST MOD 30 MIN: CPT | Mod: PBBFAC,PO | Performed by: NURSE PRACTITIONER

## 2024-05-20 PROCEDURE — 80053 COMPREHEN METABOLIC PANEL: CPT | Performed by: NURSE PRACTITIONER

## 2024-05-20 PROCEDURE — 36415 COLL VENOUS BLD VENIPUNCTURE: CPT | Mod: PO | Performed by: NURSE PRACTITIONER

## 2024-05-20 PROCEDURE — 82306 VITAMIN D 25 HYDROXY: CPT | Performed by: NURSE PRACTITIONER

## 2024-05-20 RX ORDER — CLOMIPHENE CITRATE 50 MG/1
50 TABLET ORAL WEEKLY
COMMUNITY
Start: 2023-12-12

## 2024-05-20 RX ORDER — ANASTROZOLE 1 MG/1
TABLET ORAL WEEKLY
COMMUNITY
Start: 2024-03-20

## 2024-05-20 RX ORDER — FLUOCINOLONE ACETONIDE 0.11 MG/ML
2 OIL AURICULAR (OTIC) DAILY
Qty: 20 ML | Refills: 0 | Status: SHIPPED | OUTPATIENT
Start: 2024-05-20

## 2024-05-20 RX ORDER — EZETIMIBE 10 MG/1
10 TABLET ORAL DAILY
Qty: 90 TABLET | Refills: 3 | Status: SHIPPED | OUTPATIENT
Start: 2024-05-20 | End: 2025-05-20

## 2024-05-20 RX ORDER — ROSUVASTATIN CALCIUM 40 MG/1
40 TABLET, COATED ORAL DAILY
Qty: 90 TABLET | Refills: 4 | Status: SHIPPED | OUTPATIENT
Start: 2024-05-20

## 2024-05-20 RX ORDER — ERGOCALCIFEROL 1.25 MG/1
50000 CAPSULE ORAL
Qty: 4 CAPSULE | Refills: 11 | Status: SHIPPED | OUTPATIENT
Start: 2024-05-20

## 2024-05-20 NOTE — ASSESSMENT & PLAN NOTE
Continue medications. Continue following with cardiology. Blood pressure goal less than 140/90.Discussed hypertension disease course, DASH-diet and exercise. Discussed medication regimen importance of treating high blood pressure. Patient advised of risk of untreated blood pressure. ER precautions were given for symptoms of hypertensive urgency and emergency.

## 2024-05-20 NOTE — PROGRESS NOTES
Assessment/Plan:  Problem List Items Addressed This Visit          Derm    SK (seborrheic keratosis)    Overview     Chronic. Stable. Follows with dermatology, Dr. Cabello.          Current Assessment & Plan     Follow up with dermatology.            Cardiac/Vascular    Hyperlipidemia - Primary (Chronic)    Overview     CHRONIC. STABLE. Lab analysis reviewed. May 2024: taking statin and zetia. Due for labs.  =======================================================  JANUARY 2020:  Stable on rosuvastatin 40 mg.  ======================================================  (-) CP, SOB, abdominal pain, N/V/D, constipation, jaundice, skin changes.  (-) Myalgias  Lab Results   Component Value Date    CHOL 136 10/19/2022    CHOL 131 01/17/2020    CHOL 121 02/05/2019     Lab Results   Component Value Date    HDL 49 10/19/2022    HDL 47 01/17/2020    HDL 51 02/05/2019     Lab Results   Component Value Date    LDLCALC 75.8 10/19/2022    LDLCALC 71.6 01/17/2020    LDLCALC 56 02/05/2019     Lab Results   Component Value Date    TRIG 56 10/19/2022    TRIG 62 01/17/2020    TRIG 69 02/05/2019     Lab Results   Component Value Date    CHOLHDL 36.0 10/19/2022    CHOLHDL 35.9 01/17/2020     Lab Results   Component Value Date    TOTALCHOLEST 2.8 10/19/2022    TOTALCHOLEST 2.8 01/17/2020     Lab Results   Component Value Date    ALT 9 (L) 10/19/2022    AST 18 10/19/2022    ALKPHOS 74 10/19/2022    BILITOT 0.8 10/19/2022     ======================================================    Hyperlipidemia Medications               ezetimibe (ZETIA) 10 mg tablet Take 1 tablet (10 mg total) by mouth once daily.    rosuvastatin (CRESTOR) 40 MG Tab Take 1 tablet (40 mg total) by mouth once daily.            Current Assessment & Plan     Medication refilled. Update lipid panel. Counseled on hyperlipidemia disease course, healthy diet and increased need for exercise. Please be advised of the risk of cardiovascular disease, increase stroke and heart attack  risk with uncontrolled/untreated hyperlipidemia. Patient voiced understanding and understood the treatment plan. All questions were answered.          Relevant Medications    ezetimibe (ZETIA) 10 mg tablet    rosuvastatin (CRESTOR) 40 MG Tab    Atrial fibrillation, chronic (Chronic)    Overview     May 2024: follows with cardiology. History of ablation. On metoprolol, ASA, xarelto.  =====================================================  JANUARY 2020:  Patient reports that he was in a flutter at recent follow-up with Cardiology.  Patient feels well.  Stable medications.  ======================================================  Initial HPI:  Chronic.  Stable.  History of several cardioversions in the past.  Patient taking amiodarone digoxin metoprolol and Xarelto.  Follows up with Dr. Ed Adams.           Current Assessment & Plan     Continue medication regimen.  Follow-up with Cardiology.         Hypertension, essential (Chronic)    Overview     May 2024: BP at goal today. Compliant with medications.    Initial HPI:  Blood pressure is elevated today.  Patient reports blood pressures at home run 130/80.  He checks them with the machine.  He is going to continue checking them and let me know within numbers are.  Continue metoprolol amiodarone digoxin.    =========================================================================================    CHRONIC. STABLE. BP Reviewed.  Compliant with BP medications. No SE reported.   (-) CP, SOB, palpitations, dizziness, lightheadedness, HA, arm numbness, tingling or weakness, syncope.    Creatinine   Date Value Ref Range Status   01/17/2020 1.0 0.5 - 1.4 mg/dL Final   06/27/2019 1.2 mg/dL Final     Hypertension Medications               EDARBI 40 mg Tab Take 1 tablet by mouth once daily.    metoprolol tartrate (LOPRESSOR) 25 MG tablet             Current Assessment & Plan     Continue medications. Continue following with cardiology. Blood pressure goal less than  "140/90.Discussed hypertension disease course, DASH-diet and exercise. Discussed medication regimen importance of treating high blood pressure. Patient advised of risk of untreated blood pressure. ER precautions were given for symptoms of hypertensive urgency and emergency.         Chronic systolic CHF (congestive heart failure)    Overview     Chronic. Stable. On metoprolol. Follows with cardiology, external.          Current Assessment & Plan     Continue current medications and plan of care per cardiology          Coronary artery disease involving native coronary artery of native heart without angina pectoris    Overview     Chronic. Stable. On ASA, statin, xarelto (Afib).          Current Assessment & Plan     Continue current medications and plan of care per cardiology             Endocrine    Vitamin D deficiency (Chronic)    Overview     Vit d deficiency. Takes vitamin d supplement 50,000 weekly. No SE reported, due for level check. Fatigue is slightly improved.     Lab Results   Component Value Date    [id number] 33 01/17/2020    JDBRPTIF89WL 28 (L) 11/05/2019    [id number] 27 (L) 07/24/2019             Current Assessment & Plan     Continue vitamin-D supplementation.  Recheck level.          Relevant Medications    ergocalciferol (ERGOCALCIFEROL) 50,000 unit Cap    Other Relevant Orders    Vitamin D    Testicular hypofunction    Overview     Chronic. Stable. On Arimidex and Clomid per urology. PSA and testosterone monitored per specialist.    No results found for: "PSA", "PSATOTAL", "PSAFREE", "PSAFREEPCT"  No results found for: "TESTOSTERONE", "TOTALTESTOST", "BIOTESTO"         Current Assessment & Plan     Continue current medications and plan of care per urology, follow up with specialist.             Other    Encounter for long-term (current) use of medications (Chronic)    Overview     CHRONIC. Stable. Compliant with medications for managed conditions. See medication list. No SE reported.   Routine " lab analysis is being monitored. Refills were addressed.    Lab Results   Component Value Date    WBC 6.17 10/19/2022    HGB 15.0 10/19/2022    HCT 46.1 10/19/2022    MCV 96 10/19/2022     10/19/2022       Chemistry        Component Value Date/Time     10/19/2022 1235     02/05/2019 0000    K 4.1 10/19/2022 1235    K 5.1 02/05/2019 0000     10/19/2022 1235     02/05/2019 0000    CO2 23 10/19/2022 1235    CO2 31 02/05/2019 0000    BUN 22 10/19/2022 1235    BUN 21.0 02/05/2019 0000    CREATININE 0.9 10/19/2022 1235    CREATININE 1.2 06/27/2019 0000    GLU 84 10/19/2022 1235        Component Value Date/Time    CALCIUM 9.5 10/19/2022 1235    CALCIUM 9.6 02/05/2019 0000    ALKPHOS 74 10/19/2022 1235    AST 18 10/19/2022 1235    AST 31 02/05/2019 0000    ALT 9 (L) 10/19/2022 1235    ALT 33 02/05/2019 0000    BILITOT 0.8 10/19/2022 1235    ESTGFRAFRICA 58 03/25/2022 0444    ESTGFRAFRICA >60.0 01/17/2020 0741    EGFRNONAA >60.0 01/17/2020 0741        Lab Results   Component Value Date    TSH 1.205 10/19/2022    Y1WLGOA 10.7 07/24/2019    T3FREE 2.6 07/24/2019            Current Assessment & Plan     Update labs. Has not seen PCP since 2022. Complete history and physical was completed today.  Complete and thorough medication reconciliation was performed.  Discussed risks and benefits of medications.  Advised patient on orders and health maintenance.  We discussed old records and old labs if available.  Will request any records not available through epic.  Continue current medications listed on your summary sheet.         Relevant Medications    fluocinolone acetonide oiL 0.01 % Drop    Other Relevant Orders    CBC Without Differential    Comprehensive Metabolic Panel    Lipid Panel    TSH    Hemoglobin A1C     Follow up in 6 months (on 11/20/2024), or if symptoms worsen or fail to improve, for follow up with PCP.  ER precautions for severe or worsening symptoms.     Raegan Pedraza  NP  _____________________________________________________________________________________________________________________________________________________    CC: check up     HPI: Patient is a 68-year-old male who presents in clinic today as an established patient here for check up. Chronic condition has been reviewed and remains stable. Further details as stated above. He is requesting medication refills. Also states he has bottle of ear drops with him today (fluocinolone acetonide) that he uses PRN for flares per ENT for skin inflammation and itching. States he cannot get in with ENT for a few months. He is requesting to have this refilled.     HTN: The patient is currently being treated for essential hypertension. This condition is chronic and stable. The patient is tolerating their medication well with good compliance.  Denies any adverse effects of medications.  Counseling was offered regarding low sodium diet.  The patient has a reduced salt intake. Routine exercise recommended. The patient denies headache, vision changes, chest pain, palpitations, shortness of breath, or lower extremity edema.    Hyperlipidemia: This is a chronic problem. The current episode started more than 1 year ago. The problem is controlled. Recent lipid tests were reviewed and are variable. Pertinent negatives include no chest pain or shortness of breath. Current antihyperlipidemic treatment includes statins. The current treatment provides moderate improvement of lipids. There are no compliance problems.      Past Medical History:  Past Medical History:   Diagnosis Date    Atrial fibrillation     Diverticulosis     Hyperlipidemia     Hypertension     Ruptured eardrum      Past Surgical History:   Procedure Laterality Date    COLONOSCOPY  2015    [facility]    CORONARY ARTERY BYPASS GRAFT  09/2014    [facility] [address] [facility]    EYE SURGERY  1992    [facility]    INNER EAR SURGERY Left 1980    NASAL SEPTUM SURGERY  03/2019    Dr Cannon  [place]    PROSTATE BIOPSY  04/2011     Review of patient's allergies indicates:  No Known Allergies  Social History     Tobacco Use    Smoking status: Never    Smokeless tobacco: Never   Substance Use Topics    Alcohol use: Yes     Alcohol/week: 2.0 standard drinks of alcohol     Types: 2 Glasses of wine per week    Drug use: Never     Family History   Problem Relation Name Age of Onset    Heart disease Mother Daly Sneed         bypass    Arthritis Mother Daly Sneed     Cancer Father Johnny Snede         Prostate removal    Heart disease Father Johnny Sneed         CHF    Vision loss Father Johnny Sneed         Macular degen    Hearing loss Father Johnny Sneed      Current Outpatient Medications on File Prior to Visit   Medication Sig Dispense Refill    anastrozole (ARIMIDEX) 1 mg Tab Take by mouth once a week.      aspirin 81 MG Chew       CLOMID 50 mg tablet Take 50 mg by mouth once a week.      EDARBI 40 mg Tab Take 1 tablet by mouth once daily.      metoprolol tartrate (LOPRESSOR) 25 MG tablet       rivaroxaban (XARELTO) 20 mg Tab Take 1 tablet (20 mg total) by mouth daily with dinner or evening meal. 90 tablet 4    [DISCONTINUED] ergocalciferol (ERGOCALCIFEROL) 50,000 unit Cap Take 1 capsule (50,000 Units total) by mouth every 7 days. 4 capsule 11    [DISCONTINUED] rosuvastatin (CRESTOR) 40 MG Tab Take 1 tablet (40 mg total) by mouth once daily. 90 tablet 4    [DISCONTINUED] azelastine (ASTELIN) 137 mcg (0.1 %) nasal spray 2 sprays 2 (two) times daily.      [DISCONTINUED] ezetimibe (ZETIA) 10 mg tablet Take 1 tablet (10 mg total) by mouth once daily. 90 tablet 3    [DISCONTINUED] ipratropium (ATROVENT) 42 mcg (0.06 %) nasal spray 2 sprays 3 (three) times daily.      [DISCONTINUED] sildenafil (REVATIO) 20 mg Tab Take 20 mg by mouth daily as needed.       No current facility-administered medications on file prior to visit.     Review of Systems   Constitutional:  Negative for appetite  "change, chills, fatigue and fever.   HENT:  Negative for congestion, rhinorrhea and sore throat.    Eyes:  Negative for visual disturbance.   Respiratory:  Negative for cough and shortness of breath.    Cardiovascular:  Negative for chest pain, palpitations and leg swelling.   Gastrointestinal:  Negative for abdominal pain, diarrhea and vomiting.   Genitourinary:  Negative for difficulty urinating, dysuria and hematuria.   Musculoskeletal:  Negative for arthralgias and myalgias.   Skin:  Negative for rash and wound.   Neurological:  Negative for dizziness and headaches.   Psychiatric/Behavioral:  Negative for behavioral problems. The patient is not nervous/anxious.      Vitals:    05/20/24 0804   BP: 138/86   Pulse: 73   SpO2: 96%   Weight: 89.7 kg (197 lb 12.8 oz)   Height: 5' 9" (1.753 m)     Wt Readings from Last 3 Encounters:   05/20/24 89.7 kg (197 lb 12.8 oz)   11/03/22 86.2 kg (190 lb)   10/21/22 86.6 kg (190 lb 14.7 oz)     Physical Exam  Vitals reviewed.   Constitutional:       General: He is not in acute distress.     Appearance: Normal appearance. He is not ill-appearing.   HENT:      Head: Normocephalic and atraumatic.      Right Ear: Tympanic membrane, ear canal and external ear normal.      Left Ear: Tympanic membrane, ear canal and external ear normal.      Nose: Nose normal.      Mouth/Throat:      Mouth: Mucous membranes are moist.      Pharynx: No posterior oropharyngeal erythema.   Eyes:      Extraocular Movements: Extraocular movements intact.      Conjunctiva/sclera: Conjunctivae normal.   Cardiovascular:      Rate and Rhythm: Normal rate.      Heart sounds: Normal heart sounds.   Pulmonary:      Effort: Pulmonary effort is normal. No respiratory distress.      Breath sounds: Normal breath sounds.   Abdominal:      General: Abdomen is flat. There is no distension.   Musculoskeletal:         General: Normal range of motion.      Cervical back: Normal range of motion and neck supple.   Skin:     " General: Skin is warm and dry.      Capillary Refill: Capillary refill takes less than 2 seconds.      Coloration: Skin is not pale.      Findings: No rash.   Neurological:      General: No focal deficit present.      Mental Status: He is alert and oriented to person, place, and time. Mental status is at baseline.   Psychiatric:         Mood and Affect: Mood normal.         Speech: Speech normal. Speech is not rapid and pressured, delayed or slurred.         Behavior: Behavior normal. Behavior is not agitated, slowed, aggressive, withdrawn, hyperactive or combative. Behavior is cooperative.         Thought Content: Thought content normal.         Judgment: Judgment normal.       Health Maintenance   Topic Date Due    TETANUS VACCINE  Never done    Shingles Vaccine (1 of 2) Never done    Lipid Panel  10/19/2023    PROSTATE-SPECIFIC ANTIGEN  03/13/2025    Colorectal Cancer Screening  03/18/2025    Hepatitis C Screening  Completed

## 2024-05-20 NOTE — ASSESSMENT & PLAN NOTE
Medication refilled. Update lipid panel. Counseled on hyperlipidemia disease course, healthy diet and increased need for exercise. Please be advised of the risk of cardiovascular disease, increase stroke and heart attack risk with uncontrolled/untreated hyperlipidemia. Patient voiced understanding and understood the treatment plan. All questions were answered.

## 2024-05-20 NOTE — ASSESSMENT & PLAN NOTE
Update labs. Has not seen PCP since 2022. Complete history and physical was completed today.  Complete and thorough medication reconciliation was performed.  Discussed risks and benefits of medications.  Advised patient on orders and health maintenance.  We discussed old records and old labs if available.  Will request any records not available through epic.  Continue current medications listed on your summary sheet.

## 2024-05-22 ENCOUNTER — PATIENT MESSAGE (OUTPATIENT)
Dept: FAMILY MEDICINE | Facility: CLINIC | Age: 69
End: 2024-05-22
Payer: MEDICARE

## 2024-06-04 ENCOUNTER — LAB VISIT (OUTPATIENT)
Dept: LAB | Facility: HOSPITAL | Age: 69
End: 2024-06-04
Attending: FAMILY MEDICINE
Payer: MEDICARE

## 2024-06-04 DIAGNOSIS — Z00.00 WELL ADULT EXAM: ICD-10-CM

## 2024-06-04 DIAGNOSIS — Z79.899 ENCOUNTER FOR LONG-TERM (CURRENT) USE OF MEDICATIONS: ICD-10-CM

## 2024-06-04 LAB
ERYTHROCYTE [DISTWIDTH] IN BLOOD BY AUTOMATED COUNT: 13.2 % (ref 11.5–14.5)
HCT VFR BLD AUTO: 50.4 % (ref 40–54)
HGB BLD-MCNC: 17 G/DL (ref 14–18)
MCH RBC QN AUTO: 31.8 PG (ref 27–31)
MCHC RBC AUTO-ENTMCNC: 33.7 G/DL (ref 32–36)
MCV RBC AUTO: 94 FL (ref 82–98)
PLATELET # BLD AUTO: 153 K/UL (ref 150–450)
PMV BLD AUTO: 11.2 FL (ref 9.2–12.9)
RBC # BLD AUTO: 5.35 M/UL (ref 4.6–6.2)
WBC # BLD AUTO: 6.65 K/UL (ref 3.9–12.7)

## 2024-06-04 PROCEDURE — 85027 COMPLETE CBC AUTOMATED: CPT | Performed by: FAMILY MEDICINE

## 2024-06-04 PROCEDURE — 36415 COLL VENOUS BLD VENIPUNCTURE: CPT | Mod: PO | Performed by: FAMILY MEDICINE

## 2024-06-05 NOTE — PROGRESS NOTES
Make follow-up lab appointment per recommendation below.  Check to see if patient has seen the results through my chart.  If not then,  #CALL THE PATIENT# to discuss results/see if they have questions and document verification of contact. Make F/U appt if needed. [phone]    #My interpretation that was sent to them through [facility]:  Johnny, I have reviewed your recent blood work.     Your complete blood count is normal.      =========================  Also please address any outstanding health maintenance that may be due: TETANUS VACCINE Never done  Shingles Vaccine(1 of 2) Never done  RSV Vaccine (Age 60+ and Pregnant patients)(1 - 1-dose 60+ series) Never done

## 2024-08-14 DIAGNOSIS — I48.20 ATRIAL FIBRILLATION, CHRONIC: Chronic | ICD-10-CM

## 2024-08-14 RX ORDER — RIVAROXABAN 20 MG/1
TABLET, FILM COATED ORAL
Qty: 90 TABLET | Refills: 4 | Status: SHIPPED | OUTPATIENT
Start: 2024-08-14

## 2024-09-11 ENCOUNTER — PATIENT MESSAGE (OUTPATIENT)
Dept: FAMILY MEDICINE | Facility: CLINIC | Age: 69
End: 2024-09-11
Payer: MEDICARE

## 2024-10-22 DIAGNOSIS — Z79.899 ENCOUNTER FOR LONG-TERM (CURRENT) USE OF MEDICATIONS: ICD-10-CM

## 2024-10-22 RX ORDER — FLUOCINOLONE ACETONIDE 0.11 MG/ML
OIL AURICULAR (OTIC)
Qty: 20 ML | Refills: 0 | Status: SHIPPED | OUTPATIENT
Start: 2024-10-22

## 2024-10-22 NOTE — TELEPHONE ENCOUNTER
Refill Routing Note   Medication(s) are not appropriate for processing by [facility] for the following reason(s):        Non-participating provider    [facility] action(s):  Route               Appointments  past 12m or future 3m with PCP    Date Provider   Last Visit   5/20/2024 Raegan Pedraza NP   Next Visit   Visit date not found Raegan Pedraza NP   ED visits in past 90 days: 0        Note composed:3:05 PM 10/22/2024

## 2024-11-19 ENCOUNTER — E-VISIT (OUTPATIENT)
Dept: FAMILY MEDICINE | Facility: CLINIC | Age: 69
End: 2024-11-19
Payer: MEDICARE

## 2024-11-19 ENCOUNTER — PATIENT MESSAGE (OUTPATIENT)
Dept: FAMILY MEDICINE | Facility: CLINIC | Age: 69
End: 2024-11-19
Payer: MEDICARE

## 2024-11-19 DIAGNOSIS — R05.1 ACUTE COUGH: ICD-10-CM

## 2024-11-19 DIAGNOSIS — J01.90 ACUTE SINUSITIS, RECURRENCE NOT SPECIFIED, UNSPECIFIED LOCATION: Primary | ICD-10-CM

## 2024-11-19 RX ORDER — PREDNISONE 10 MG/1
10 TABLET ORAL 2 TIMES DAILY
Qty: 10 TABLET | Refills: 0 | Status: SHIPPED | OUTPATIENT
Start: 2024-11-19

## 2024-11-19 RX ORDER — BENZONATATE 100 MG/1
100 CAPSULE ORAL 3 TIMES DAILY PRN
Qty: 30 CAPSULE | Refills: 0 | Status: SHIPPED | OUTPATIENT
Start: 2024-11-19 | End: 2024-11-29

## 2024-11-19 NOTE — PROGRESS NOTES
Patient ID: Johnny Sneed is a 69 y.o. male.    Chief Complaint: URI (Entered automatically based on patient selection in [employer].)    The patient initiated a request through [employer] on 11/19/2024 for evaluation and management with a chief complaint of URI (Entered automatically based on patient selection in [employer].)     I evaluated the questionnaire submission on 11/19/24.    [facility] P[facility] E-Visit Covid    11/19/2024  8:19 AM CST - Filed by Patient   Do you agree to participate in an E-Visit? Yes   If you have any of the following symptoms, go to your local emergency room or call 911: I acknowledge   Choose the state of your primary residence [place]   What is the main issue you would like addressed today? nasal drip / cough   Do you think you might have COVID or the Flu? No   Have you tested positive for COVID or Flu? No   What symptoms do you currently have?  Cough;  Nasal Congestion;  Runny nose;  Sore throat   Describe your cough: Productive (containing mucus)   Describe the mucus: Clear   Have you had any of the following? None of the above   Have you ever smoked? I have never smoked   Have you had a fever? No   When did your symptoms first appear? 11/15/2024   In the last two weeks, have you been in close contact with someone who has COVID-19 or the Flu? No   List what you have done or taken to help your symptoms. Mucinex   How severe are your symptoms? Moderate   Have your symptoms gotten better or worse since they started?  No change   Do you have transportation to get testing if it is needed and ordered for you at an [facility] location? Yes   Provide any additional information you feel is important. no fever   Please attach any relevant images or files    Are you able to take your vital signs? No          Active Problem List with Overview Notes    Diagnosis Date Noted    Testicular hypofunction 05/20/2024     Chronic. Stable. On Arimidex and Clomid per urology. PSA and testosterone monitored per  "specialist.    No results found for: "PSA", "PSATOTAL", "PSAFREE", "PSAFREEPCT"  No results found for: "TESTOSTERONE", "TOTALTESTOST", "BIOTESTO"      SK (seborrheic keratosis) 10/21/2022     Chronic. Stable. Follows with dermatology, Dr. Cabello.       Chronic systolic CHF (congestive heart failure) 02/11/2022     Chronic. Stable. On metoprolol. Follows with cardiology, external.       Coronary artery disease involving native coronary artery of native heart without angina pectoris 02/11/2022     Chronic. Stable. On ASA, statin, xarelto (Afib).       Seasonal allergic rhinitis 05/10/2021    Atrial flutter 01/24/2020     New diagnosis recently found by Cardiology.  Stable medications.  No issues.      Elevated liver enzymes 01/24/2020     Patient with chronic elevation in LFTs.   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  Patient has known about the elevated liver enzymes and fatty liver for several years.  It is been monitored with routine blood work.    Patient is establish with [facility].  Reports he had a colonoscopy performed in or about 2015 and was told to return in 10 years.    Records have been requested.    Patient denies any abdominal pain nausea vomiting diarrhea.  No change in skin color.      Fatty liver 01/24/2020     US Abdomen Complete  Narrative: EXAMINATION:  US ABDOMEN COMPLETE    CLINICAL HISTORY:  elevated liver functions; Abnormal levels of other serum enzymes    TECHNIQUE:  Complete abdominal ultrasound (including pancreas, liver, gallbladder, common bile duct, spleen, aorta, IVC, and kidneys) was performed.    COMPARISON:  None    FINDINGS:  Complete abdominal ultrasound performed. The liver measures 13.6 cm in length, normal in size and is homogeneously increased in echotexture, most consistent with " diffuse fatty infiltration..  Common bile duct is within normal limits at 0.62 cm.  There are 2 small stones noted within the gallbladder the largest measuring up to 6.6 mm.  No gallbladder wall thickening.  The sonographic Kitchen sign is reported as negative.  The visualized portions of the pancreas, IVC and abdominal aorta are within normal limits.   The right kidney measures 10.3 cm. The left kidney measures 11.0 cm. No hydronephrosis or renal masses identified.  The spleen measures 9.9 cm.  Impression: 1. Diffuse fatty infiltration of the liver.  2. Cholelithiasis.  .    Electronically signed by: Caleb Fitzgerald DO  Date:    01/17/2020  Time:    09:01          Hyperlipidemia 07/24/2019     CHRONIC. STABLE. Lab analysis reviewed. May 2024: taking statin and zetia. Due for labs.  =======================================================  JANUARY 2020:  Stable on rosuvastatin 40 mg.  ======================================================  (-) CP, SOB, abdominal pain, N/V/D, constipation, jaundice, skin changes.  (-) Myalgias  Lab Results   Component Value Date    CHOL 136 10/19/2022    CHOL 131 01/17/2020    CHOL 121 02/05/2019     Lab Results   Component Value Date    HDL 49 10/19/2022    HDL 47 01/17/2020    HDL 51 02/05/2019     Lab Results   Component Value Date    LDLCALC 75.8 10/19/2022    LDLCALC 71.6 01/17/2020    LDLCALC 56 02/05/2019     Lab Results   Component Value Date    TRIG 56 10/19/2022    TRIG 62 01/17/2020    TRIG 69 02/05/2019     Lab Results   Component Value Date    CHOLHDL 36.0 10/19/2022    CHOLHDL 35.9 01/17/2020     Lab Results   Component Value Date    TOTALCHOLEST 2.8 10/19/2022    TOTALCHOLEST 2.8 01/17/2020     Lab Results   Component Value Date    ALT 9 (L) 10/19/2022    AST 18 10/19/2022    ALKPHOS 74 10/19/2022    BILITOT 0.8 10/19/2022     ======================================================    Hyperlipidemia Medications               ezetimibe (ZETIA) 10 mg tablet Take 1 tablet (10  mg total) by mouth once daily.    rosuvastatin (CRESTOR) 40 MG Tab Take 1 tablet (40 mg total) by mouth once daily.         Atrial fibrillation, chronic 07/24/2019     May 2024: follows with cardiology. History of ablation. On metoprolol, ASA, xarelto.  =====================================================  JANUARY 2020:  Patient reports that he was in a flutter at recent follow-up with Cardiology.  Patient feels well.  Stable medications.  ======================================================  Initial HPI:  Chronic.  Stable.  History of several cardioversions in the past.  Patient taking amiodarone digoxin metoprolol and Xarelto.  Follows up with Dr. Ed Adams.        Encounter for long-term (current) use of medications 07/24/2019     CHRONIC. Stable. Compliant with medications for managed conditions. See medication list. No SE reported.   Routine lab analysis is being monitored. Refills were addressed.    Lab Results   Component Value Date    WBC 6.17 10/19/2022    HGB 15.0 10/19/2022    HCT 46.1 10/19/2022    MCV 96 10/19/2022     10/19/2022       Chemistry        Component Value Date/Time     10/19/2022 1235     02/05/2019 0000    K 4.1 10/19/2022 1235    K 5.1 02/05/2019 0000     10/19/2022 1235     02/05/2019 0000    CO2 23 10/19/2022 1235    CO2 31 02/05/2019 0000    BUN 22 10/19/2022 1235    BUN 21.0 02/05/2019 0000    CREATININE 0.9 10/19/2022 1235    CREATININE 1.2 06/27/2019 0000    GLU 84 10/19/2022 1235        Component Value Date/Time    CALCIUM 9.5 10/19/2022 1235    CALCIUM 9.6 02/05/2019 0000    ALKPHOS 74 10/19/2022 1235    AST 18 10/19/2022 1235    AST 31 02/05/2019 0000    ALT 9 (L) 10/19/2022 1235    ALT 33 02/05/2019 0000    BILITOT 0.8 10/19/2022 1235    ESTGFRAFRICA 58 03/25/2022 0444    ESTGFRAFRICA >60.0 01/17/2020 0741    EGFRNONAA >60.0 01/17/2020 0741        Lab Results   Component Value Date    TSH 1.205 10/19/2022    Q1EIRMU 10.7 07/24/2019    T3FREE  2.6 07/24/2019         Vitamin D deficiency 07/24/2019     Vit d deficiency. Takes vitamin d supplement 50,000 weekly. No SE reported, due for level check. Fatigue is slightly improved.     Lab Results   Component Value Date    TCWLSCRK34OG 33 01/17/2020    HXISTNFV84AR 28 (L) 11/05/2019    DULEVZYN04AF 27 (L) 07/24/2019          Hypertension, essential 07/24/2019     May 2024: BP at goal today. Compliant with medications.    Initial HPI:  Blood pressure is elevated today.  Patient reports blood pressures at home run 130/80.  He checks them with the machine.  He is going to continue checking them and let me know within numbers are.  Continue metoprolol amiodarone digoxin.    =========================================================================================    CHRONIC. STABLE. BP Reviewed.  Compliant with BP medications. No SE reported.   (-) CP, SOB, palpitations, dizziness, lightheadedness, HA, arm numbness, tingling or weakness, syncope.    Creatinine   Date Value Ref Range Status   01/17/2020 1.0 0.5 - 1.4 mg/dL Final   06/27/2019 1.2 mg/dL Final     Hypertension Medications               EDARBI 40 mg Tab Take 1 tablet by mouth once daily.    metoprolol tartrate (LOPRESSOR) 25 MG tablet            Recent Labs Obtained:  No visits with results within 7 Day(s) from this visit.   Latest known visit with results is:   Lab Visit on 06/04/2024   Component Date Value Ref Range Status    WBC 06/04/2024 6.65  3.90 - 12.70 K/uL Final    RBC 06/04/2024 5.35  4.60 - 6.20 M/uL Final    Hemoglobin 06/04/2024 17.0  14.0 - 18.0 g/dL Final    Hematocrit 06/04/2024 50.4  40.0 - 54.0 % Final    MCV 06/04/2024 94  82 - 98 fL Final    MCH 06/04/2024 31.8 (H)  27.0 - 31.0 pg Final    MCHC 06/04/2024 33.7  32.0 - 36.0 g/dL Final    RDW 06/04/2024 13.2  11.5 - 14.5 % Final    Platelets 06/04/2024 153  150 - 450 K/uL Final    MPV 06/04/2024 11.2  9.2 - 12.9 fL Final       Encounter Diagnoses   Name Primary?    Acute sinusitis,  recurrence not specified, unspecified location Yes    Acute cough         No orders of the defined types were placed in this encounter.     Medications Ordered This Encounter   Medications    benzonatate (TESSALON) 100 MG capsule     Sig: Take 1 capsule (100 mg total) by mouth 3 (three) times daily as needed for Cough.     Dispense:  30 capsule     Refill:  0    predniSONE (DELTASONE) 10 MG tablet     Sig: Take 1 tablet (10 mg total) by mouth 2 (two) times daily.     Dispense:  10 tablet     Refill:  0        E-Visit Time Trackin minutes

## 2024-11-25 ENCOUNTER — E-VISIT (OUTPATIENT)
Dept: FAMILY MEDICINE | Facility: CLINIC | Age: 69
End: 2024-11-25
Payer: MEDICARE

## 2024-11-25 DIAGNOSIS — B96.89 BACTERIAL RESPIRATORY INFECTION: Primary | ICD-10-CM

## 2024-11-25 DIAGNOSIS — R05.1 ACUTE COUGH: ICD-10-CM

## 2024-11-25 DIAGNOSIS — J98.8 BACTERIAL RESPIRATORY INFECTION: Primary | ICD-10-CM

## 2024-11-25 RX ORDER — AZITHROMYCIN 250 MG/1
TABLET, FILM COATED ORAL
Qty: 6 TABLET | Refills: 0 | Status: SHIPPED | OUTPATIENT
Start: 2024-11-25 | End: 2024-11-30

## 2024-11-25 RX ORDER — PROMETHAZINE HYDROCHLORIDE AND DEXTROMETHORPHAN HYDROBROMIDE 6.25; 15 MG/5ML; MG/5ML
5 SYRUP ORAL EVERY 6 HOURS PRN
Qty: 118 ML | Refills: 0 | Status: SHIPPED | OUTPATIENT
Start: 2024-11-25 | End: 2024-12-05

## 2024-11-25 NOTE — PROGRESS NOTES
Patient ID: Johnny Sneed is a 69 y.o. male.    Chief Complaint: General Illness (Entered automatically based on patient selection in [employer].)    The patient initiated a request through [employer] on 11/25/2024 for evaluation and management with a chief complaint of General Illness (Entered automatically based on patient selection in [employer].)     I evaluated the questionnaire submission on 11/25/24.    [facility] Peq Evisit Supergroup-Cough And Cold    11/25/2024 11:23 AM CST - Filed by Patient   What do you need help with? Cough, Cold, Sore Throat   Do you agree to participate in an E-Visit? Yes   If you have any of the following symptoms, go to your local emergency room or call 911: I acknowledge   What is the main issue you would like addressed today? My sinus is good, still NO FEVER, but I'm still got a cough that's got my throat raw...is there anything else I can get/do?      Thank You, rc   Do you think you might have COVID or the Flu? No   Have you tested positive for COVID or Flu? No   What symptoms do you currently have?  Cough   Describe your cough: Productive (containing mucus)   Describe the mucus: Clear   Have you ever smoked? I have never smoked   Have you had a fever? No   When did your symptoms first appear? 11/17/2024   In the last two weeks, have you been in close contact with someone who has COVID-19 or the Flu? No   List what you have done or taken to help your symptoms. Musinex,      [facility]   How severe are your symptoms? Mild   Have your symptoms gotten better or worse since they started?  Better   Do you have transportation to get testing if it is needed and ordered for you at an [facility] location? Yes   Provide any additional information you feel is important. finished the prednisone, still have 12 benzonatate left (taking 3 daily).   Please attach any relevant images or files    Are you able to take your vital signs? No          Active Problem List with Overview Notes    Diagnosis Date  "Noted    Testicular hypofunction 05/20/2024     Chronic. Stable. On Arimidex and Clomid per urology. PSA and testosterone monitored per specialist.    No results found for: "PSA", "PSATOTAL", "PSAFREE", "PSAFREEPCT"  No results found for: "TESTOSTERONE", "TOTALTESTOST", "BIOTESTO"      SK (seborrheic keratosis) 10/21/2022     Chronic. Stable. Follows with dermatology, Dr. Cabello.       Chronic systolic CHF (congestive heart failure) 02/11/2022     Chronic. Stable. On metoprolol. Follows with cardiology, external.       Coronary artery disease involving native coronary artery of native heart without angina pectoris 02/11/2022     Chronic. Stable. On ASA, statin, xarelto (Afib).       Seasonal allergic rhinitis 05/10/2021    Atrial flutter 01/24/2020     New diagnosis recently found by Cardiology.  Stable medications.  No issues.      Elevated liver enzymes 01/24/2020     Patient with chronic elevation in LFTs.   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  Patient has known about the elevated liver enzymes and fatty liver for several years.  It is been monitored with routine blood work.    Patient is establish with [facility].  Reports he had a colonoscopy performed in or about 2015 and was told to return in 10 years.    Records have been requested.    Patient denies any abdominal pain nausea vomiting diarrhea.  No change in skin color.      Fatty liver 01/24/2020     US Abdomen Complete  Narrative: EXAMINATION:  US ABDOMEN COMPLETE    CLINICAL HISTORY:  elevated liver functions; Abnormal levels of other serum enzymes    TECHNIQUE:  Complete abdominal ultrasound (including pancreas, liver, gallbladder, common bile duct, spleen, aorta, IVC, and kidneys) was performed.    COMPARISON:  None    FINDINGS:  Complete abdominal ultrasound " performed. The liver measures 13.6 cm in length, normal in size and is homogeneously increased in echotexture, most consistent with diffuse fatty infiltration..  Common bile duct is within normal limits at 0.62 cm.  There are 2 small stones noted within the gallbladder the largest measuring up to 6.6 mm.  No gallbladder wall thickening.  The sonographic Kitchen sign is reported as negative.  The visualized portions of the pancreas, IVC and abdominal aorta are within normal limits.   The right kidney measures 10.3 cm. The left kidney measures 11.0 cm. No hydronephrosis or renal masses identified.  The spleen measures 9.9 cm.  Impression: 1. Diffuse fatty infiltration of the liver.  2. Cholelithiasis.  .    Electronically signed by: Caleb Fitzgerald DO  Date:    01/17/2020  Time:    09:01          Hyperlipidemia 07/24/2019     CHRONIC. STABLE. Lab analysis reviewed. May 2024: taking statin and zetia. Due for labs.  =======================================================  JANUARY 2020:  Stable on rosuvastatin 40 mg.  ======================================================  (-) CP, SOB, abdominal pain, N/V/D, constipation, jaundice, skin changes.  (-) Myalgias  Lab Results   Component Value Date    CHOL 136 10/19/2022    CHOL 131 01/17/2020    CHOL 121 02/05/2019     Lab Results   Component Value Date    HDL 49 10/19/2022    HDL 47 01/17/2020    HDL 51 02/05/2019     Lab Results   Component Value Date    LDLCALC 75.8 10/19/2022    LDLCALC 71.6 01/17/2020    LDLCALC 56 02/05/2019     Lab Results   Component Value Date    TRIG 56 10/19/2022    TRIG 62 01/17/2020    TRIG 69 02/05/2019     Lab Results   Component Value Date    CHOLHDL 36.0 10/19/2022    CHOLHDL 35.9 01/17/2020     Lab Results   Component Value Date    TOTALCHOLEST 2.8 10/19/2022    TOTALCHOLEST 2.8 01/17/2020     Lab Results   Component Value Date    ALT 9 (L) 10/19/2022    AST 18 10/19/2022    ALKPHOS 74 10/19/2022    BILITOT 0.8 10/19/2022      ======================================================    Hyperlipidemia Medications               ezetimibe (ZETIA) 10 mg tablet Take 1 tablet (10 mg total) by mouth once daily.    rosuvastatin (CRESTOR) 40 MG Tab Take 1 tablet (40 mg total) by mouth once daily.         Atrial fibrillation, chronic 07/24/2019     May 2024: follows with cardiology. History of ablation. On metoprolol, ASA, xarelto.  =====================================================  JANUARY 2020:  Patient reports that he was in a flutter at recent follow-up with Cardiology.  Patient feels well.  Stable medications.  ======================================================  Initial HPI:  Chronic.  Stable.  History of several cardioversions in the past.  Patient taking amiodarone digoxin metoprolol and Xarelto.  Follows up with Dr. Ed Adams.        Encounter for long-term (current) use of medications 07/24/2019     CHRONIC. Stable. Compliant with medications for managed conditions. See medication list. No SE reported.   Routine lab analysis is being monitored. Refills were addressed.    Lab Results   Component Value Date    WBC 6.17 10/19/2022    HGB 15.0 10/19/2022    HCT 46.1 10/19/2022    MCV 96 10/19/2022     10/19/2022       Chemistry        Component Value Date/Time     10/19/2022 1235     02/05/2019 0000    K 4.1 10/19/2022 1235    K 5.1 02/05/2019 0000     10/19/2022 1235     02/05/2019 0000    CO2 23 10/19/2022 1235    CO2 31 02/05/2019 0000    BUN 22 10/19/2022 1235    BUN 21.0 02/05/2019 0000    CREATININE 0.9 10/19/2022 1235    CREATININE 1.2 06/27/2019 0000    GLU 84 10/19/2022 1235        Component Value Date/Time    CALCIUM 9.5 10/19/2022 1235    CALCIUM 9.6 02/05/2019 0000    ALKPHOS 74 10/19/2022 1235    AST 18 10/19/2022 1235    AST 31 02/05/2019 0000    ALT 9 (L) 10/19/2022 1235    ALT 33 02/05/2019 0000    BILITOT 0.8 10/19/2022 1235    ESTGFRAFRICA 58 03/25/2022 0444    ESTGFRAFRICA >60.0  01/17/2020 0741    EGFRNONAA >60.0 01/17/2020 0741        Lab Results   Component Value Date    TSH 1.205 10/19/2022    X1COBEV 10.7 07/24/2019    T3FREE 2.6 07/24/2019         Vitamin D deficiency 07/24/2019     Vit d deficiency. Takes vitamin d supplement 50,000 weekly. No SE reported, due for level check. Fatigue is slightly improved.     Lab Results   Component Value Date    OKXFNBKQ83YP 33 01/17/2020    OJXERRSY00ON 28 (L) 11/05/2019    SIFGLXUR22ZZ 27 (L) 07/24/2019          Hypertension, essential 07/24/2019     May 2024: BP at goal today. Compliant with medications.    Initial HPI:  Blood pressure is elevated today.  Patient reports blood pressures at home run 130/80.  He checks them with the machine.  He is going to continue checking them and let me know within numbers are.  Continue metoprolol amiodarone digoxin.    =========================================================================================    CHRONIC. STABLE. BP Reviewed.  Compliant with BP medications. No SE reported.   (-) CP, SOB, palpitations, dizziness, lightheadedness, HA, arm numbness, tingling or weakness, syncope.    Creatinine   Date Value Ref Range Status   01/17/2020 1.0 0.5 - 1.4 mg/dL Final   06/27/2019 1.2 mg/dL Final     Hypertension Medications               EDARBI 40 mg Tab Take 1 tablet by mouth once daily.    metoprolol tartrate (LOPRESSOR) 25 MG tablet            Recent Labs Obtained:  No visits with results within 7 Day(s) from this visit.   Latest known visit with results is:   Lab Visit on 06/04/2024   Component Date Value Ref Range Status    WBC 06/04/2024 6.65  3.90 - 12.70 K/uL Final    RBC 06/04/2024 5.35  4.60 - 6.20 M/uL Final    Hemoglobin 06/04/2024 17.0  14.0 - 18.0 g/dL Final    Hematocrit 06/04/2024 50.4  40.0 - 54.0 % Final    MCV 06/04/2024 94  82 - 98 fL Final    MCH 06/04/2024 31.8 (H)  27.0 - 31.0 pg Final    MCHC 06/04/2024 33.7  32.0 - 36.0 g/dL Final    RDW 06/04/2024 13.2  11.5 - 14.5 % Final     Platelets 2024 153  150 - 450 K/uL Final    MPV 2024 11.2  9.2 - 12.9 fL Final     Encounter Diagnoses   Name Primary?    Bacterial respiratory infection Yes    Acute cough       No orders of the defined types were placed in this encounter.     Medications Ordered This Encounter   Medications    azithromycin (Z-LALIT) 250 MG tablet     Sig: Take 2 tablets by mouth on day 1; Take 1 tablet by mouth on days 2-5     Dispense:  6 tablet     Refill:  0    promethazine-dextromethorphan (PROMETHAZINE-DM) 6.25-15 mg/5 mL Syrp     Sig: Take 5 mLs by mouth every 6 (six) hours as needed (cough).     Dispense:  118 mL     Refill:  0      E-Visit Time Trackin minutes

## 2025-01-14 DIAGNOSIS — Z00.00 ENCOUNTER FOR MEDICARE ANNUAL WELLNESS EXAM: ICD-10-CM

## 2025-01-20 ENCOUNTER — PATIENT MESSAGE (OUTPATIENT)
Dept: FAMILY MEDICINE | Facility: CLINIC | Age: 70
End: 2025-01-20
Payer: MEDICARE

## 2025-01-28 ENCOUNTER — LAB VISIT (OUTPATIENT)
Dept: LAB | Facility: HOSPITAL | Age: 70
End: 2025-01-28
Attending: FAMILY MEDICINE
Payer: MEDICARE

## 2025-01-28 DIAGNOSIS — Z13.6 ENCOUNTER FOR LIPID SCREENING FOR CARDIOVASCULAR DISEASE: ICD-10-CM

## 2025-01-28 DIAGNOSIS — Z13.220 ENCOUNTER FOR LIPID SCREENING FOR CARDIOVASCULAR DISEASE: ICD-10-CM

## 2025-01-28 DIAGNOSIS — Z79.899 ENCOUNTER FOR LONG-TERM (CURRENT) USE OF MEDICATIONS: ICD-10-CM

## 2025-01-28 DIAGNOSIS — Z00.00 WELL ADULT EXAM: ICD-10-CM

## 2025-01-28 LAB
ALBUMIN SERPL BCP-MCNC: 4 G/DL (ref 3.5–5.2)
ALP SERPL-CCNC: 63 U/L (ref 40–150)
ALT SERPL W/O P-5'-P-CCNC: 14 U/L (ref 10–44)
ANION GAP SERPL CALC-SCNC: 11 MMOL/L (ref 8–16)
AST SERPL-CCNC: 22 U/L (ref 10–40)
BILIRUB SERPL-MCNC: 0.7 MG/DL (ref 0.1–1)
BUN SERPL-MCNC: 19 MG/DL (ref 8–23)
CALCIUM SERPL-MCNC: 10 MG/DL (ref 8.7–10.5)
CHLORIDE SERPL-SCNC: 107 MMOL/L (ref 95–110)
CHOLEST SERPL-MCNC: 133 MG/DL (ref 120–199)
CHOLEST/HDLC SERPL: 3.3 {RATIO} (ref 2–5)
CO2 SERPL-SCNC: 26 MMOL/L (ref 23–29)
CREAT SERPL-MCNC: 1.2 MG/DL (ref 0.5–1.4)
ERYTHROCYTE [DISTWIDTH] IN BLOOD BY AUTOMATED COUNT: 13.2 % (ref 11.5–14.5)
EST. GFR  (NO RACE VARIABLE): >60 ML/MIN/1.73 M^2
ESTIMATED AVG GLUCOSE: 117 MG/DL (ref 68–131)
GLUCOSE SERPL-MCNC: 94 MG/DL (ref 70–110)
HBA1C MFR BLD: 5.7 % (ref 4–5.6)
HCT VFR BLD AUTO: 55.4 % (ref 40–54)
HDLC SERPL-MCNC: 40 MG/DL (ref 40–75)
HDLC SERPL: 30.1 % (ref 20–50)
HGB BLD-MCNC: 18.1 G/DL (ref 14–18)
LDLC SERPL CALC-MCNC: 76.6 MG/DL (ref 63–159)
MCH RBC QN AUTO: 31.6 PG (ref 27–31)
MCHC RBC AUTO-ENTMCNC: 32.7 G/DL (ref 32–36)
MCV RBC AUTO: 97 FL (ref 82–98)
NONHDLC SERPL-MCNC: 93 MG/DL
PLATELET # BLD AUTO: 125 K/UL (ref 150–450)
PMV BLD AUTO: 11.4 FL (ref 9.2–12.9)
POTASSIUM SERPL-SCNC: 4.9 MMOL/L (ref 3.5–5.1)
PROT SERPL-MCNC: 7.4 G/DL (ref 6–8.4)
RBC # BLD AUTO: 5.73 M/UL (ref 4.6–6.2)
SODIUM SERPL-SCNC: 144 MMOL/L (ref 136–145)
TRIGL SERPL-MCNC: 82 MG/DL (ref 30–150)
TSH SERPL DL<=0.005 MIU/L-ACNC: 1.99 UIU/ML (ref 0.4–4)
WBC # BLD AUTO: 6 K/UL (ref 3.9–12.7)

## 2025-01-28 PROCEDURE — 80061 LIPID PANEL: CPT | Performed by: FAMILY MEDICINE

## 2025-01-28 PROCEDURE — 83036 HEMOGLOBIN GLYCOSYLATED A1C: CPT | Performed by: FAMILY MEDICINE

## 2025-01-28 PROCEDURE — 84443 ASSAY THYROID STIM HORMONE: CPT | Performed by: FAMILY MEDICINE

## 2025-01-28 PROCEDURE — 80053 COMPREHEN METABOLIC PANEL: CPT | Performed by: FAMILY MEDICINE

## 2025-01-28 PROCEDURE — 85027 COMPLETE CBC AUTOMATED: CPT | Performed by: FAMILY MEDICINE

## 2025-01-31 ENCOUNTER — OFFICE VISIT (OUTPATIENT)
Dept: FAMILY MEDICINE | Facility: CLINIC | Age: 70
End: 2025-01-31
Payer: MEDICARE

## 2025-01-31 VITALS
BODY MASS INDEX: 29.79 KG/M2 | HEART RATE: 92 BPM | DIASTOLIC BLOOD PRESSURE: 80 MMHG | WEIGHT: 201.13 LBS | HEIGHT: 69 IN | SYSTOLIC BLOOD PRESSURE: 130 MMHG | OXYGEN SATURATION: 96 %

## 2025-01-31 DIAGNOSIS — H34.8320 TRIBUTARY (BRANCH) RETINAL VEIN OCCLUSION, LEFT EYE, WITH MACULAR EDEMA: ICD-10-CM

## 2025-01-31 DIAGNOSIS — I48.20 ATRIAL FIBRILLATION, CHRONIC: Chronic | ICD-10-CM

## 2025-01-31 DIAGNOSIS — Z00.00 WELL ADULT EXAM: Primary | ICD-10-CM

## 2025-01-31 DIAGNOSIS — Z23 NEED FOR VACCINATION: ICD-10-CM

## 2025-01-31 DIAGNOSIS — R73.03 PREDIABETES: ICD-10-CM

## 2025-01-31 DIAGNOSIS — Z79.899 ENCOUNTER FOR LONG-TERM (CURRENT) USE OF MEDICATIONS: ICD-10-CM

## 2025-01-31 DIAGNOSIS — I50.22 CHRONIC SYSTOLIC CHF (CONGESTIVE HEART FAILURE): ICD-10-CM

## 2025-01-31 DIAGNOSIS — E55.9 VITAMIN D DEFICIENCY: Chronic | ICD-10-CM

## 2025-01-31 DIAGNOSIS — E78.5 HYPERLIPIDEMIA, UNSPECIFIED HYPERLIPIDEMIA TYPE: ICD-10-CM

## 2025-01-31 PROCEDURE — 90653 IIV ADJUVANT VACCINE IM: CPT | Mod: PBBFAC,PO

## 2025-01-31 PROCEDURE — 99999PBSHW PR PBB SHADOW TECHNICAL ONLY FILED TO HB: Mod: PBBFAC,,,

## 2025-01-31 PROCEDURE — 99215 OFFICE O/P EST HI 40 MIN: CPT | Mod: PBBFAC,PO | Performed by: FAMILY MEDICINE

## 2025-01-31 PROCEDURE — 99397 PER PM REEVAL EST PAT 65+ YR: CPT | Mod: GZ,S$PBB,, | Performed by: FAMILY MEDICINE

## 2025-01-31 PROCEDURE — G0008 ADMIN INFLUENZA VIRUS VAC: HCPCS | Mod: PBBFAC,PO

## 2025-01-31 PROCEDURE — 99999 PR PBB SHADOW E&M-EST. PATIENT-LVL V: CPT | Mod: PBBFAC,,, | Performed by: FAMILY MEDICINE

## 2025-01-31 RX ORDER — ROSUVASTATIN CALCIUM 40 MG/1
40 TABLET, COATED ORAL DAILY
Qty: 90 TABLET | Refills: 4 | Status: SHIPPED | OUTPATIENT
Start: 2025-01-31

## 2025-01-31 RX ORDER — EZETIMIBE 10 MG/1
10 TABLET ORAL DAILY
Qty: 90 TABLET | Refills: 4 | Status: SHIPPED | OUTPATIENT
Start: 2025-01-31 | End: 2026-01-31

## 2025-01-31 RX ADMIN — INFLUENZA A VIRUS A/VICTORIA/4897/2022 IVR-238 (H1N1) ANTIGEN (FORMALDEHYDE INACTIVATED), INFLUENZA A VIRUS A/THAILAND/8/2022 IVR-237 (H3N2) ANTIGEN (FORMALDEHYDE INACTIVATED), INFLUENZA B VIRUS B/AUSTRIA/1359417/2021 BVR-26 ANTIGEN (FORMALDEHYDE INACTIVATED) 0.5 ML: 15; 15; 15 INJECTION, SUSPENSION INTRAMUSCULAR at 09:01

## 2025-01-31 NOTE — PATIENT INSTRUCTIONS
Moe Turner,     If you are due for any health screening(s) below please notify me so we can arrange them to be ordered and scheduled. Most healthy patients at your age complete them, but you are free to accept or refuse.     If you can't do it, I'll definitely understand. If you can, I'd certainly appreciate it!    All of your core healthy metrics are met.

## 2025-01-31 NOTE — PROGRESS NOTES
This note is specifically for wellness visit performed today.   WELLNESS EXAM    Patient ID: Johnny Sneed is a 69 y.o. male.  has a past medical history of Atrial fibrillation, Diverticulosis, Hyperlipidemia, Hypertension, and Ruptured eardrum.   Chief Complaint:  Encounter for wellness exam    Well Adult Physical: Patient here for a comprehensive physical exam.The patient reports chronic problems.  History of Present Illness  The patient is a 69-year-old male who presents for an annual wellness visit and review of labs.    He had an episode of upper respiratory tract infection, which has since resolved. He reports an improvement in his condition following the initiation of medication in November 2024 for a lingering upper respiratory infection, characterized more by sinus-related symptoms than bronchial issues.    He is currently on Xarelto once daily for atrial fibrillation, with no reported complications. His primary and secondary insurances are Medicare and [employer] respectively.    He continues to take vitamin D 50,000 units once weekly, as recommended by Raegan Pedraza NP.    He has been prescribed Clomid by Dr. Omar Seay due to low testosterone levels, a treatment he has been undergoing for the past year. He is not taking testosterone. He is also on Arimidex to manage the effects of Clomid, which he reports as beneficial.    He is scheduled for a sleep study with Dr. Davis Garcia due to concerns about sleep apnea. He has a family history of snoring and is open to treatment for this condition.    He has a history of congestive heart failure, for which he underwent ablation. His condition remains stable, and he is not on any diuretics. He is currently taking Edarbi.    He is under the care of Dr. Joesph Mcconnell for retinal vein occlusion and is receiving steroid injections in his left eye, with reported progress.    FAMILY HISTORY  The patient mentions a long line of snorers in the  "family.    MEDICATIONS  Xarelto, vitamin D, Clomid, Edarbi, Arimidex     Chronic. Vit d deficiency. Takes vitamin d supplement. No SE reported. Fatigue is slightly improved.   Lab Results   Component Value Date    [id number] 28 (L) 05/20/2024    [id number] 33 01/17/2020    [id number] 28 (L) 11/05/2019      Diabetes Management Status    Statin: Taking  ACE/ARB: Not taking    Screening or Prevention Patient's value Goal Complete/Controlled?   HgA1C Testing and Control   Lab Results   Component Value Date    HGBA1C 5.7 (H) 01/28/2025      Annually/Less than 8% Yes   Lipid profile : 01/28/2025 Annually Yes   LDL control Lab Results   Component Value Date    LDLCALC 76.6 01/28/2025    Annually/Less than 100 mg/dl  Yes   Nephropathy screening No results found for: "LABMICR"  Lab Results   Component Value Date    PROTEINUA Negative 07/24/2019     No results found for: "UTPCR"   Annually No   Blood pressure BP Readings from Last 1 Encounters:   01/31/25 130/80    Less than 140/90 Yes   Dilated retinal exam Most Recent Eye Exam Date: Not Found Annually No   Foot exam   Most Recent Foot Exam Date: Not Found Annually No         Reviewed care team: Dr. Seay - Uro in [place]  Dr. Ed Adams- Cardiology; [place], Dgao Corbett MD    Ortho Joseph Lopez MD  Hand Johnny Vargas MD  [facility] cscope Dr. Leisa Schroeder    October 2022:  Patient reports that he is doing well.  Recently had a fractured patella that has healed up.  Following with orthopedics.  No surgery was required.    Patient's cardiac condition is stable.    Chronic. Vit d deficiency. Takes vitamin d supplement. No SE reported. Fatigue is slightly improved.   Lab Results   Component Value Date    [id number] 28 (L) 05/20/2024    [id number] 33 01/17/2020    [id number] 28 (L) 11/05/2019      Seasonal allergic rhinitis:  Follows with [facility]. Chronic.  Intermittent control.  Patient has regular flare ups throughout the year.  Patient is " "taking over-the-counter medications with good response.    Do you take any herbs or supplements that were not prescribed by a doctor? no   Are you taking calcium supplements? no   No results found for: "UIBC", "IRON", "TRANS", "TRANSFERRIN", "TIBC", "LABIRON", "FESATURATED"   No results found for: "YBJJRAEQ37"  No results found for: "FOLATE"       History: Dr. Seay - Uro in [place]  Date last PSA: No results found for: "PSA" followed by urologist, requesting record  No results found for: "TESTOSTERONE" No results found for: "TESTOSTERONE", "TOTALTESTOST", "BIOTESTO"   Colon cancer screening:  due 2025    Health Maintenance Topics with due status: Not Due       Topic Last Completion Date    PROSTATE-SPECIFIC ANTIGEN 03/13/2024    Hemoglobin A1c (Prediabetes) 01/28/2025    Lipid Panel 01/28/2025      ==============================================  History reviewed.   Health Maintenance Due   Topic Date Due    Colorectal Cancer Screening  03/18/2025   Patient agree to influenza and pneumonia vaccines today.  Discussed risk and benefits.  Patient agreed.    Past Medical History:  Past Medical History:   Diagnosis Date    Atrial fibrillation     Diverticulosis     Hyperlipidemia     Hypertension     Ruptured eardrum      Past Surgical History:   Procedure Laterality Date    COLONOSCOPY  2015    [facility]    CORONARY ARTERY BYPASS GRAFT  09/2014    [facility] [address] [facility]    EYE SURGERY  1992    [facility]    INNER EAR SURGERY Left 1980    NASAL SEPTUM SURGERY  03/2019    Dr Davis Obrien    PROSTATE BIOPSY  04/2011     Review of patient's allergies indicates:  No Known Allergies  Current Outpatient Medications on File Prior to Visit   Medication Sig Dispense Refill    anastrozole (ARIMIDEX) 1 mg Tab Take by mouth once a week.      aspirin 81 MG Chew       CLOMID 50 mg tablet Take 50 mg by mouth once a week.      EDARBI 40 mg Tab Take 1 tablet by mouth once daily.      ergocalciferol (ERGOCALCIFEROL) 50,000 unit " Cap Take 1 capsule (50,000 Units total) by mouth every 7 days. 4 capsule 11    fluocinolone acetonide oiL 0.01 % Drop place TWO drops in ear(s) once daily 20 mL 0    metoprolol tartrate (LOPRESSOR) 25 MG tablet       [DISCONTINUED] ezetimibe (ZETIA) 10 mg tablet Take 1 tablet (10 mg total) by mouth once daily. 90 tablet 3    [DISCONTINUED] rosuvastatin (CRESTOR) 40 MG Tab Take 1 tablet (40 mg total) by mouth once daily. 90 tablet 4    [DISCONTINUED] XARELTO 20 mg Tab TAKE ONE TABLET BY MOUTH DAILY WITH dinner or IN THE EVENING MEAL 90 tablet 4    [DISCONTINUED] predniSONE (DELTASONE) 10 MG tablet Take 1 tablet (10 mg total) by mouth 2 (two) times daily. 10 tablet 0     No current facility-administered medications on file prior to visit.     Social History     Socioeconomic History    Marital status:    Tobacco Use    Smoking status: Never    Smokeless tobacco: Never   Substance and Sexual Activity    Alcohol use: Yes     Alcohol/week: 2.0 standard drinks of alcohol     Types: 2 Glasses of wine per week    Drug use: Never    Sexual activity: Not Currently     Partners: Female     Birth control/protection: Abstinence, Coitus interruptus, Condom     Social Drivers of Health     Financial Resource Strain: Low Risk  (5/17/2024)    Overall Financial Resource Strain (CARDIA)     Difficulty of Paying Living Expenses: Not very hard   Food Insecurity: No Food Insecurity (5/17/2024)    Hunger Vital Sign     Worried About Running Out of Food in the Last Year: Never true     Ran Out of Food in the Last Year: Never true   Transportation Needs: No Transportation Needs (2/6/2020)    PRAPARE - Transportation     Lack of Transportation (Medical): No     Lack of Transportation (Non-Medical): No   Physical Activity: Insufficiently Active (5/17/2024)    Exercise Vital Sign     Days of Exercise per Week: 2 days     Minutes of Exercise per Session: 30 min   Stress: No Stress Concern Present (5/17/2024)    [employer]  [employer] - Occupational Stress Questionnaire     Feeling of Stress : Only a little   Housing Stability: Unknown (5/17/2024)    Housing Stability Vital Sign     Unable to Pay for Housing in the Last Year: No     Family History   Problem Relation Name Age of Onset    Heart disease Mother Daly Sneed         bypass    Arthritis Mother Daly Sneed     Cancer Father Johnny Sneed         Prostate removal    Heart disease Father Johnny Sneed         CHF    Vision loss Father Johnny Sneed         Macular degen    Hearing loss Father Johnny Sneed        Review of Systems   Constitutional: Negative.  Negative for activity change, chills, fatigue, fever and unexpected weight change.   HENT:  Negative for ear pain, hearing loss, postnasal drip, rhinorrhea, sinus pressure, sore throat and trouble swallowing.    Eyes: Negative.  Negative for discharge, redness and visual disturbance.   Respiratory: Negative.  Negative for cough, chest tightness, shortness of breath and wheezing.    Cardiovascular: Negative.  Negative for chest pain and palpitations.   Gastrointestinal: Negative.  Negative for blood in stool, constipation, diarrhea, nausea and vomiting.   Endocrine: Negative.  Negative for cold intolerance, heat intolerance, polydipsia and polyuria.   Genitourinary: Negative.  Negative for difficulty urinating, hematuria and urgency.   Musculoskeletal:  Positive for arthralgias. Negative for joint swelling, myalgias and neck pain.   Skin: Negative.  Negative for rash and wound.   Allergic/Immunologic: Negative.  Negative for environmental allergies and food allergies.   Neurological: Negative.  Negative for weakness and headaches.   Hematological: Negative.  Negative for adenopathy. Does not bruise/bleed easily.   Psychiatric/Behavioral: Negative.  Negative for confusion, dysphoric mood and sleep disturbance. The patient is not nervous/anxious.    All other systems reviewed and are negative.        Objective:     Vitals:    01/31/25 0834   BP: 130/80   Pulse: 92    Body mass index is 29.7 kg/m².  Physical Exam  Vitals and nursing note reviewed.   Constitutional:       General: He is not in acute distress.     Appearance: He is well-developed. He is not diaphoretic.   HENT:      Head: Normocephalic and atraumatic.      Right Ear: External ear normal.      Left Ear: External ear normal.      Nose: No congestion or rhinorrhea.   Eyes:      Extraocular Movements: Extraocular movements intact.      Pupils: Pupils are equal, round, and reactive to light.   Neck:      Vascular: No carotid bruit.   Cardiovascular:      Rate and Rhythm: Normal rate.      Pulses: Normal pulses.   Pulmonary:      Effort: Pulmonary effort is normal. No respiratory distress.      Breath sounds: Normal breath sounds.   Abdominal:      General: Bowel sounds are normal.      Palpations: Abdomen is soft.   Musculoskeletal:         General: Tenderness present. No deformity. Normal range of motion.      Cervical back: Normal range of motion and neck supple.   Lymphadenopathy:      Cervical: No cervical adenopathy.   Skin:     General: Skin is warm and dry.      Capillary Refill: Capillary refill takes less than 2 seconds.      Findings: No rash.   Neurological:      General: No focal deficit present.      Mental Status: He is alert and oriented to person, place, and time.      Cranial Nerves: No cranial nerve deficit.      Motor: No weakness.      Gait: Gait normal.   Psychiatric:         Attention and Perception: He is attentive.         Mood and Affect: Mood normal. Mood is not anxious or depressed. Affect is not labile, blunt, angry or inappropriate.         Speech: He is communicative. Speech is not rapid and pressured, delayed, slurred or tangential.         Behavior: Behavior normal. Behavior is not agitated, slowed, aggressive, withdrawn, hyperactive or combative.         Thought Content: Thought content normal. Thought content is not  paranoid or delusional. Thought content does not include homicidal or suicidal ideation. Thought content does not include homicidal or suicidal plan.         Cognition and Memory: Memory is not impaired.         Judgment: Judgment normal. Judgment is not impulsive or inappropriate.          Lab Visit on 01/28/2025   Component Date Value Ref Range Status    WBC 01/28/2025 6.00  3.90 - 12.70 K/uL Final    RBC 01/28/2025 5.73  4.60 - 6.20 M/uL Final    Hemoglobin 01/28/2025 18.1 (H)  14.0 - 18.0 g/dL Final    Hematocrit 01/28/2025 55.4 (H)  40.0 - 54.0 % Final    MCV 01/28/2025 97  82 - 98 fL Final    MCH 01/28/2025 31.6 (H)  27.0 - 31.0 pg Final    MCHC 01/28/2025 32.7  32.0 - 36.0 g/dL Final    RDW 01/28/2025 13.2  11.5 - 14.5 % Final    Platelets 01/28/2025 125 (L)  150 - 450 K/uL Final    MPV 01/28/2025 11.4  9.2 - 12.9 fL Final    TSH 01/28/2025 1.991  0.400 - 4.000 uIU/mL Final    Sodium 01/28/2025 144  136 - 145 mmol/L Final    Potassium 01/28/2025 4.9  3.5 - 5.1 mmol/L Final    Chloride 01/28/2025 107  95 - 110 mmol/L Final    CO2 01/28/2025 26  23 - 29 mmol/L Final    Glucose 01/28/2025 94  70 - 110 mg/dL Final    BUN 01/28/2025 19  8 - 23 mg/dL Final    Creatinine 01/28/2025 1.2  0.5 - 1.4 mg/dL Final    Calcium 01/28/2025 10.0  8.7 - 10.5 mg/dL Final    Total Protein 01/28/2025 7.4  6.0 - 8.4 g/dL Final    Albumin 01/28/2025 4.0  3.5 - 5.2 g/dL Final    Total Bilirubin 01/28/2025 0.7  0.1 - 1.0 mg/dL Final    Comment: For infants and newborns, interpretation of results should be based  on gestational age, weight and in agreement with clinical  observations.    Premature Infant recommended reference ranges:  Up to 24 hours.............<8.0 mg/dL  Up to 48 hours............<12.0 mg/dL  3-5 days..................<15.0 mg/dL  6-29 days.................<15.0 mg/dL      Alkaline Phosphatase 01/28/2025 63  40 - 150 U/L Final    AST 01/28/2025 22  10 - 40 U/L Final    ALT 01/28/2025 14  10 - 44 U/L Final    eGFR  01/28/2025 >60.0  >60 mL/min/1.73 m^2 Final    Anion Gap 01/28/2025 11  8 - 16 mmol/L Final    Hemoglobin A1C 01/28/2025 5.7 (H)  4.0 - 5.6 % Final    Comment: ADA Screening Guidelines:  5.7-6.4%  Consistent with prediabetes  >or=6.5%  Consistent with diabetes    High levels of fetal hemoglobin interfere with the HbA1C  assay. Heterozygous hemoglobin variants (HbS, HgC, etc)do  not significantly interfere with this assay.   However, presence of multiple variants may affect accuracy.      Estimated Avg Glucose 01/28/2025 117  68 - 131 mg/dL Final    Cholesterol 01/28/2025 133  120 - 199 mg/dL Final    Comment: The National Cholesterol Education Program (NCEP) has set the  following guidelines (reference ranges) for Cholesterol:  Optimal.....................<200 mg/dL  Borderline High.............200-239 mg/dL  High........................> or = 240 mg/dL      Triglycerides 01/28/2025 82  30 - 150 mg/dL Final    Comment: The National Cholesterol Education Program (NCEP) has set the  following guidelines (reference values) for triglycerides:  Normal......................<150 mg/dL  Borderline High.............150-199 mg/dL  High........................200-499 mg/dL      HDL 01/28/2025 40  40 - 75 mg/dL Final    Comment: The National Cholesterol Education Program (NCEP) has set the  following guidelines (reference values) for HDL Cholesterol:  Low...............<40 mg/dL  Optimal...........>60 mg/dL      LDL Cholesterol 01/28/2025 76.6  63.0 - 159.0 mg/dL Final    Comment: The National Cholesterol Education Program (NCEP) has set the  following guidelines (reference values) for LDL Cholesterol:  Optimal.......................<130 mg/dL  Borderline High...............130-159 mg/dL  High..........................160-189 mg/dL  Very High.....................>190 mg/dL      HDL/Cholesterol Ratio 01/28/2025 30.1  20.0 - 50.0 % Final    Total Cholesterol/HDL Ratio 01/28/2025 3.3  2.0 - 5.0 Final    Non-HDL Cholesterol  01/28/2025 93  mg/dL Final    Comment: Risk category and Non-HDL cholesterol goals:  Coronary heart disease (CHD)or equivalent (10-year risk of CHD >20%):  Non-HDL cholesterol goal     <130 mg/dL  Two or more CHD risk factors and 10-year risk of CHD <= 20%:  Non-HDL cholesterol goal     <160 mg/dL  0 to 1 CHD risk factor:  Non-HDL cholesterol goal     <190 mg/dL          Assessment / Plan:    1.  Routine health exam-patient here for annual wellness exam.  Labs ordered.  Health maintenance was reviewed and ordered.  Anticipatory guidance: Don't smoke.  Healthy diet and regular exercise recommended. Vaccine recommendations discussed.  See orders.  Reviewed Anticipatory guidance, risk factor reduction interventions or counseling as needed, Complete history , physical was completed today.  Complete and thorough medication reconciliation was performed.  Discussed risks and benefits of medications.  Advised patient on orders and health maintenance.  We discussed old records and old labs if available.  Will request any records not available through epic.  Continue current medications listed on your summary sheet.  All questions were answered. Patient had no further concerns. Advised of diagnoses and plan. Follow up as planned or return sooner if symptoms persist or worsen.   Assessment & Plan  1. Annual wellness visit.  His hemoglobin levels have shown a slight increase, which could be attributed to the Clomid treatment. His renal and hepatic functions are within normal limits. Cholesterol levels are well-managed. However, there has been a minor elevation in his blood glucose levels, indicating a prediabetic state. He is advised to consider regular blood donations, approximately every 8 weeks or a few times annually. He is also encouraged to reduce his intake of carbohydrates, bread, pasta, rice, and sugar, while increasing his physical activity such as walking or exercising. A refill of all his medications will be  provided. He will receive an influenza vaccine today. Laboratory tests will be conducted again next year.  Counseled on hyperlipidemia disease course, healthy diet and increased need for exercise.  Please be advised of the risk of cardiovascular disease, increase stroke and heart attack risk with uncontrolled/untreated hyperlipidemia.     Patient voiced understanding and understood the treatment plan. All questions were answered.     2. History of Upper respiratory tract infection.  He had an episode of upper respiratory tract infection which has since resolved. No further treatment is necessary at this time.    3. Atrial fibrillation.  He is currently taking Xarelto once a day for atrial fibrillation and reports no issues. He will continue this medication as prescribed by Dr. Adams.    4. Low testosterone.  He is taking Clomid as prescribed by Dr. Omar Seay for low testosterone. This has resulted in an increase in his hemoglobin levels, indicating the medication is effective. He will continue with Clomid.    5. Congestive heart failure.  He has a history of congestive heart failure but is currently stable and not on any diuretics. He is taking Edarbi as part of his treatment regimen.  Heart failure precautions.  Follow-up with Cardiology.  ER precautions.    6. Sleep apnea.  He will undergo a sleep study with Dr. Davis Garcia to evaluate for sleep apnea. He is advised to follow up with the results and consider treatment options as it may improve his blood pressure and overall well-being.    7. Retinal vein occlusion.  He is receiving steroid shots in his left eye from Dr. Joesph Mcconnell and reports that the condition is progressing well.  Follow-up with ophthalmology.    8. Vitamin D deficiency.  He is taking vitamin D 50,000 units once a week and occasionally supplements with additional vitamin D as suggested by Raegan Pedraza NP.  Discussed risk and benefits of flu vaccination.  We will plan to monitor  hemoglobin A1c at designated intervals 3 to 6 months.  I recommend ongoing Education for diabetic diet and exercise protocol.  We will continue to monitor for side effects.    Please be advised of symptoms to monitor for and to notify me immediately if persistent or worsening.  Follow up with Ophthalmology/Optometry and Podiatry at least annually.    Previous plan:  Cardiac conditions are stable.  Follow-up with Cardiology.  Labs are being sent to his cardiologist per  Continue vitamin-D supplementation.  Short prednisone burst for flare of seasonal allergic rhinitis.  Discussed condition course and signs and symptoms to expect.  Patient advised take anti-inflammatories and or Tylenol for pain or fever.  ER precautions.  Call MD or follow-up to clinic if not improving or worsening symptoms.  Seborrheic keratosis noted throughout the back and some on the chest.  Reassurance provided.  Patient to follow-up with Dermatology if having any new or concerning skin lesions.  Also recommended to see derm once yearly for routine skin check.    Fractured patella: Follow-up with orthopedics.    Follow-up with Cardiology and Urology as scheduled.    Patient reports recent cardiac testing was within normal limits.  Requesting records.    Orders Placed This Encounter   Procedures    Vitamin D     Standing Status:   Future     Standing Expiration Date:   1/31/2026     Order Specific Question:   Send normal result to authorizing provider's In Basket if patient is active on MyChart:     Answer:   Yes          Future Appointments       Date Provider Specialty Appt Notes    2/3/2025 Bhavani Winter, DNP Family Medicine *eAWV- 1/29 CONFIRMED VIA MYCHART- [facility]           Joseph Garcia MD

## 2025-02-03 ENCOUNTER — OFFICE VISIT (OUTPATIENT)
Dept: FAMILY MEDICINE | Facility: CLINIC | Age: 70
End: 2025-02-03
Payer: MEDICARE

## 2025-02-03 VITALS
TEMPERATURE: 97 F | BODY MASS INDEX: 30.39 KG/M2 | DIASTOLIC BLOOD PRESSURE: 68 MMHG | WEIGHT: 205.19 LBS | OXYGEN SATURATION: 96 % | HEIGHT: 69 IN | SYSTOLIC BLOOD PRESSURE: 105 MMHG | HEART RATE: 83 BPM

## 2025-02-03 DIAGNOSIS — Z79.899 ENCOUNTER FOR LONG-TERM (CURRENT) USE OF MEDICATIONS: Chronic | ICD-10-CM

## 2025-02-03 DIAGNOSIS — I10 HYPERTENSION, ESSENTIAL: Chronic | ICD-10-CM

## 2025-02-03 DIAGNOSIS — Z00.00 ENCOUNTER FOR MEDICARE ANNUAL WELLNESS EXAM: ICD-10-CM

## 2025-02-03 DIAGNOSIS — I50.22 CHRONIC SYSTOLIC CHF (CONGESTIVE HEART FAILURE): ICD-10-CM

## 2025-02-03 DIAGNOSIS — H34.8320 TRIBUTARY (BRANCH) RETINAL VEIN OCCLUSION, LEFT EYE, WITH MACULAR EDEMA: ICD-10-CM

## 2025-02-03 DIAGNOSIS — E29.1 TESTICULAR HYPOFUNCTION: ICD-10-CM

## 2025-02-03 DIAGNOSIS — I25.10 CORONARY ARTERY DISEASE INVOLVING NATIVE CORONARY ARTERY OF NATIVE HEART WITHOUT ANGINA PECTORIS: ICD-10-CM

## 2025-02-03 DIAGNOSIS — E55.9 VITAMIN D DEFICIENCY: Chronic | ICD-10-CM

## 2025-02-03 DIAGNOSIS — R73.03 PREDIABETES: ICD-10-CM

## 2025-02-03 DIAGNOSIS — I48.20 ATRIAL FIBRILLATION, CHRONIC: Primary | Chronic | ICD-10-CM

## 2025-02-03 DIAGNOSIS — E78.5 HYPERLIPIDEMIA, UNSPECIFIED HYPERLIPIDEMIA TYPE: Chronic | ICD-10-CM

## 2025-02-03 PROCEDURE — 99215 OFFICE O/P EST HI 40 MIN: CPT | Mod: PBBFAC,PO | Performed by: NURSE PRACTITIONER

## 2025-02-03 PROCEDURE — 99999 PR PBB SHADOW E&M-EST. PATIENT-LVL V: CPT | Mod: PBBFAC,,, | Performed by: NURSE PRACTITIONER

## 2025-02-03 PROCEDURE — G0439 PPPS, SUBSEQ VISIT: HCPCS | Mod: ,,, | Performed by: NURSE PRACTITIONER

## 2025-02-03 NOTE — PATIENT INSTRUCTIONS
Moe Turner,     If you are due for any health screening(s) below please notify me so we can arrange them to be ordered and scheduled. Most healthy patients at your age complete them, but you are free to accept or refuse.     If you can't do it, I'll definitely understand. If you can, I'd certainly appreciate it!    All of your core healthy metrics are met.                   Continue current plan of care  Hydrate well  Rest  Report to ER immediately if symptoms worsen or persist  Counseling and Referral of Other Preventative  (Italic type indicates deductible and co-insurance are waived)    Patient Name: Johnny Sneed  Today's Date: 2/3/2025    Health Maintenance       Date Due Completion Date    RSV Vaccine (Age 60+ and Pregnant patients) (1 - Risk 60-74 years 1-dose series) Never done ---    Colorectal Cancer Screening 03/18/2025 3/18/2015    TETANUS VACCINE 01/31/2026 (Originally 6/2/1973) ---    Shingles Vaccine (1 of 2) 01/31/2026 (Originally 6/2/2006) ---    COVID-19 Vaccine (2 - 2024-25 season) 01/31/2026 (Originally 9/1/2024) 12/24/2021    PROSTATE-SPECIFIC ANTIGEN 03/13/2025 3/13/2024    Hemoglobin A1c (Prediabetes) 01/28/2026 1/28/2025    Lipid Panel 01/28/2026 1/28/2025        No orders of the defined types were placed in this encounter.      The following information is provided to all patients.  This information is to help you find resources for any of the problems found today that may be affecting your health:                  Living healthy guide: www.[facility].[place].gov      Understanding Diabetes: www.diabetes.org      Eating healthy: www.cdc.gov/healthyweight      CDC home safety checklist: www.cdc.gov/steadi/patient.html      Agency on Aging: www.goea[facility].gov      Alcoholics anonymous (AA): www.aa.org      Physical Activity: www.lisa.nih.gov/ih3lrvt      Tobacco use: www.quitwithusla.org

## 2025-02-03 NOTE — PROGRESS NOTES
"  Johnny Sneed presented for a follow-up Medicare AWV today. The following components were reviewed and updated:    Medical history  Family History  Social history  Allergies and Current Medications  Health Risk Assessment  Health Maintenance  Care Team    **See Completed Assessments for Annual Wellness visit with in the encounter summary    The following assessments were completed:  Depression Screening  Cognitive function Screening  Timed Get Up Test  Whisper Test      Opioid documentation:      Patient does not have a current opioid prescription.          Vitals:    02/03/25 1054   BP: 105/68   Pulse: 83   Temp: 97.3 °F (36.3 °C)   SpO2: 96%   Weight: 93.1 kg (205 lb 3.2 oz)   Height: 5' 9" (1.753 m)     Body mass index is 30.3 kg/m².       Physical Exam  Vitals and nursing note reviewed.   Constitutional:       Appearance: Normal appearance.   HENT:      Head: Normocephalic.      Right Ear: Tympanic membrane, ear canal and external ear normal.      Left Ear: Tympanic membrane, ear canal and external ear normal.      Nose: Nose normal.      Mouth/Throat:      Mouth: Mucous membranes are moist.      Pharynx: Oropharynx is clear.   Eyes:      Conjunctiva/sclera: Conjunctivae normal.      Pupils: Pupils are equal, round, and reactive to light.   Cardiovascular:      Rate and Rhythm: Normal rate and regular rhythm.      Pulses: Normal pulses.      Heart sounds: Normal heart sounds.   Pulmonary:      Effort: Pulmonary effort is normal.      Breath sounds: Normal breath sounds.   Abdominal:      General: Bowel sounds are normal.      Palpations: Abdomen is soft.   Musculoskeletal:         General: Normal range of motion.      Cervical back: Normal range of motion and neck supple.   Skin:     General: Skin is warm and dry.      Capillary Refill: Capillary refill takes 2 to 3 seconds.   Neurological:      Mental Status: He is alert and oriented to person, place, and time.   Psychiatric:         Mood and Affect: Mood " normal.         Behavior: Behavior normal.         Thought Content: Thought content normal.         Judgment: Judgment normal.       Diagnoses and health risks identified today and associated recommendations/orders:  1. Encounter for Medicare annual wellness exam  Stable  Up to date  - Ambulatory Referral/Consult to Enhanced Annual Wellness Visit (eAWV)    2. Atrial fibrillation, chronic  Stable   Managed by cardiology  Taking metoprolol, xarelto  Continue current medications, plan of care  F/U with specialist as advised    3. Chronic systolic CHF (congestive heart failure)  Stable   Managed by cardiology  Continue current medications, plan of care  F/U with specialist as advised    4. Tributary (branch) retinal vein occlusion, left eye, with macular edema  Stable   Managed by ophthalmology  Continue current medications, plan of care  F/U with specialist as advised    5. Coronary artery disease involving native coronary artery of native heart without angina pectoris  Stable   Managed by cardiology  Currently taking crestor, zetia  Continue current medications, plan of care  F/U with specialist as advised    6. Hypertension, essential  Stable   Managed by cardiology, PCP  Currently taking edarbi, metoprolol  Continue current medications, plan of care  F/U with specialist, PCP as advised    7. Hyperlipidemia, unspecified hyperlipidemia type  Stable   Managed by cardiology, PCP  Currently taking crestor, zetia  Continue current medications, plan of care  F/U with specialist, PCP as advised    8. Vitamin D deficiency  Stable   Managed by PCP  Continue current medications, plan of care  F/U with PCP as advised    9. Testicular hypofunction  Stable   Managed by urology  Continue current medications, plan of care  F/U with specialist as advised    10. Prediabetes  Stable   Managed by PCP  Consider metformin therapy  Continue current medications, plan of care  F/U with PCP as advised    11. Encounter for long-term (current)  use of medications  Stable  Continue current plan of care      Provided Johnny with a 5-10 year written screening schedule and personal prevention plan. Recommendations were developed using the USPSTF age appropriate recommendations. Education, counseling, and referrals were provided as needed.  After Visit Summary printed and given to patient which includes a list of additional screenings\tests needed.    No follow-ups on file.      Bhavani Winter, TIANNA    I offered to discuss advanced care planning, including how to pick a person who would make decisions for you if you were unable to make them for yourself, called a health care power of , and what kind of decisions you might make such as use of life sustaining treatments such as ventilators and tube feeding when faced with a life limiting illness recorded on a living will that they will need to know. (How you want to be cared for as you near the end of your natural life)     X Patient is interested in learning more about how to make advanced directives.  I provided them paperwork and offered to discuss this with them.

## 2025-04-03 ENCOUNTER — PATIENT MESSAGE (OUTPATIENT)
Dept: FAMILY MEDICINE | Facility: CLINIC | Age: 70
End: 2025-04-03
Payer: MEDICARE

## 2025-04-09 ENCOUNTER — PATIENT MESSAGE (OUTPATIENT)
Dept: FAMILY MEDICINE | Facility: CLINIC | Age: 70
End: 2025-04-09

## 2025-04-09 ENCOUNTER — OFFICE VISIT (OUTPATIENT)
Dept: FAMILY MEDICINE | Facility: CLINIC | Age: 70
End: 2025-04-09
Payer: MEDICARE

## 2025-04-09 DIAGNOSIS — I10 HYPERTENSION, ESSENTIAL: Chronic | ICD-10-CM

## 2025-04-09 DIAGNOSIS — I48.20 ATRIAL FIBRILLATION, CHRONIC: Chronic | ICD-10-CM

## 2025-04-09 DIAGNOSIS — Z79.899 ENCOUNTER FOR LONG-TERM (CURRENT) USE OF MEDICATIONS: Chronic | ICD-10-CM

## 2025-04-09 DIAGNOSIS — R73.03 PREDIABETES: ICD-10-CM

## 2025-04-09 DIAGNOSIS — R79.89 ABNORMAL CBC: Primary | ICD-10-CM

## 2025-04-09 DIAGNOSIS — E29.1 TESTICULAR HYPOFUNCTION: ICD-10-CM

## 2025-04-09 DIAGNOSIS — E78.5 HYPERLIPIDEMIA, UNSPECIFIED HYPERLIPIDEMIA TYPE: Chronic | ICD-10-CM

## 2025-04-09 PROBLEM — R74.8 ELEVATED LIVER ENZYMES: Status: RESOLVED | Noted: 2020-01-24 | Resolved: 2025-04-09

## 2025-04-09 NOTE — ASSESSMENT & PLAN NOTE
See abnormal CBC  Complete history and physical was completed today.  Complete and thorough medication reconciliation was performed.  Discussed risks and benefits of medications.  Advised patient on orders and health maintenance.  We discussed old records and old labs if available.  Will request any records not available through epic.  Continue current medications listed on your summary sheet.

## 2025-04-09 NOTE — ASSESSMENT & PLAN NOTE
Counseled on hyperlipidemia disease course, healthy diet and increased need for exercise. Please be advised of the risk of cardiovascular disease, increase stroke and heart attack risk with uncontrolled/untreated hyperlipidemia. Patient voiced understanding and understood the treatment plan. All questions were answered.

## 2025-04-09 NOTE — PROGRESS NOTES
Primary Care Telemedicine Note  The patient location is:  Patient's Home - [place]  The chief complaint leading to consultation is:   Chief Complaint   Patient presents with    Abnormal Lab      Total time:  see MDM below. The total time spent on the encounter, which includes face to face time and non-face to face time preparing to see the patient (eg, review of previous medical records, tests), Obtaining and/or reviewing separately obtained history, documenting clinical information in the electronic or other health record, independently interpreting results (not separately reported)/communicating results to the patient/family/caregiver, and/or care coordination (not separately reported).      Visit type: Virtual visit with synchronous audio and video    Each patient to whom he or she provides medical services by telemedicine is:  (1) informed of the relationship between the physician and patient and the respective role of any other health care provider with respect to management of the patient; and (2) notified that he or she may decline to receive medical services by telemedicine and may withdraw from such care at any time.  =================================================================    Assessment/Plan:  Problem List Items Addressed This Visit          Psychiatric    Encounter for long-term (current) use of medications (Chronic)    Overview   CHRONIC. Stable. Compliant with medications for managed conditions. See medication list. No SE reported. Routine lab analysis is being monitored. Refills were addressed. Reviewed labs.     Lab Results   Component Value Date    WBC 6.00 01/28/2025    HGB 18.1 (H) 01/28/2025    HCT 55.4 (H) 01/28/2025    MCV 97 01/28/2025     (L) 01/28/2025       Chemistry        Component Value Date/Time     01/28/2025 0921     02/05/2019 0000    K 4.9 01/28/2025 0921    K 5.1 02/05/2019 0000     01/28/2025 0921     02/05/2019 0000    CO2 26 01/28/2025  0921    CO2 31 02/05/2019 0000    BUN 19 01/28/2025 0921    BUN 21.0 02/05/2019 0000    CREATININE 1.2 01/28/2025 0921    CREATININE 1.2 06/27/2019 0000    GLU 94 01/28/2025 0921        Component Value Date/Time    CALCIUM 10.0 01/28/2025 0921    CALCIUM 9.6 02/05/2019 0000    ALKPHOS 63 01/28/2025 0921    AST 22 01/28/2025 0921    AST 31 02/05/2019 0000    ALT 14 01/28/2025 0921    ALT 33 02/05/2019 0000    BILITOT 0.7 01/28/2025 0921    ESTGFRAFRICA 58 03/25/2022 0444    ESTGFRAFRICA >60.0 01/17/2020 0741    EGFRNONAA >60.0 01/17/2020 0741        Lab Results   Component Value Date    TSH 1.991 01/28/2025    K2XLAVV 10.7 07/24/2019    T3FREE 2.6 07/24/2019            Current Assessment & Plan   See abnormal CBC  Complete history and physical was completed today.  Complete and thorough medication reconciliation was performed.  Discussed risks and benefits of medications.  Advised patient on orders and health maintenance.  We discussed old records and old labs if available.  Will request any records not available through epic.  Continue current medications listed on your summary sheet.            Cardiac/Vascular    Hyperlipidemia (Chronic)    Overview   April 2025: reviewed labs      Hyperlipidemia Medications               ezetimibe (ZETIA) 10 mg tablet Take 1 tablet (10 mg total) by mouth once daily.    rosuvastatin (CRESTOR) 40 MG Tab Take 1 tablet (40 mg total) by mouth once daily.          CHRONIC. STABLE. Lab analysis reviewed. May 2024: taking statin and zetia. Due for labs.  JANUARY 2020:  Stable on rosuvastatin 40 mg.  (-) CP, SOB, abdominal pain, N/V/D, constipation, jaundice, skin changes.  (-) Myalgias  Lab Results   Component Value Date    CHOL 133 01/28/2025    CHOL 165 05/20/2024    CHOL 136 10/19/2022     Lab Results   Component Value Date    HDL 40 01/28/2025    HDL 41 05/20/2024    HDL 49 10/19/2022     Lab Results   Component Value Date    LDLCALC 76.6 01/28/2025    LDLCALC 106.4 05/20/2024     LDLCALC 75.8 10/19/2022     Lab Results   Component Value Date    TRIG 82 01/28/2025    TRIG 88 05/20/2024    TRIG 56 10/19/2022     Lab Results   Component Value Date    CHOLHDL 30.1 01/28/2025    CHOLHDL 24.8 05/20/2024    CHOLHDL 36.0 10/19/2022     Lab Results   Component Value Date    TOTALCHOLEST 3.3 01/28/2025    TOTALCHOLEST 4.0 05/20/2024    TOTALCHOLEST 2.8 10/19/2022     Lab Results   Component Value Date    ALT 14 01/28/2025    AST 22 01/28/2025    ALKPHOS 63 01/28/2025    BILITOT 0.7 01/28/2025     ======================================================    Hyperlipidemia Medications               ezetimibe (ZETIA) 10 mg tablet Take 1 tablet (10 mg total) by mouth once daily.    rosuvastatin (CRESTOR) 40 MG Tab Take 1 tablet (40 mg total) by mouth once daily.            Current Assessment & Plan   Counseled on hyperlipidemia disease course, healthy diet and increased need for exercise. Please be advised of the risk of cardiovascular disease, increase stroke and heart attack risk with uncontrolled/untreated hyperlipidemia. Patient voiced understanding and understood the treatment plan. All questions were answered.          Atrial fibrillation, chronic (Chronic)    Overview   Chronic. Follows with cardiology. History of ablation. On metoprolol, ASA, xarelto.         Current Assessment & Plan   Continue medication regimen.  Follow-up with Cardiology.         Hypertension, essential (Chronic)    Overview   CHRONIC. STABLE. BP Reviewed.  Compliant with BP medications. No SE reported.   (-) CP, SOB, palpitations, dizziness, lightheadedness, HA, arm numbness, tingling or weakness, syncope.    Creatinine   Date Value Ref Range Status   01/28/2025 1.2 0.5 - 1.4 mg/dL Final   06/27/2019 1.2 mg/dL Final     Hypertension Medications              EDARBI 40 mg Tab Take 1 tablet by mouth once daily.    metoprolol tartrate (LOPRESSOR) 25 MG tablet               Current Assessment & Plan   Continue medications. Continue  "following with cardiology. Blood pressure goal less than 140/90.Discussed hypertension disease course, DASH-diet and exercise. Discussed medication regimen importance of treating high blood pressure. Patient advised of risk of untreated blood pressure. ER precautions were given for symptoms of hypertensive urgency and emergency.            Endocrine    Testicular hypofunction    Overview   Chronic. Stable. He has been prescribed Clomid by Dr. Omar Seay due to low testosterone levels, a treatment he has been undergoing for the past year. He is not taking testosterone. He is also on Arimidex to manage the effects of Clomid, which he reports as beneficial.     No results found for: "PSA", "PSATOTAL", "PSAFREE", "PSAFREEPCT"  No results found for: "TESTOSTERONE", "TOTALTESTOST", "BIOTESTO"         Current Assessment & Plan   Continue current medications and plan of care per urology.          Prediabetes    Overview   Diet controlled  Prediabetes Management Status  Statin: Taking  ACE/ARB: Not taking    Screening or Prevention Patient's value Goal Complete/Controlled?   HgA1C Testing and Control   Lab Results   Component Value Date    HGBA1C 5.7 (H) 01/28/2025      Annually/Less than 8% Yes   Lipid profile : 01/28/2025 Annually Yes   LDL control Lab Results   Component Value Date    LDLCALC 76.6 01/28/2025    Annually/Less than 100 mg/dl  Yes   Nephropathy screening No results found for: "LABMICR"  Lab Results   Component Value Date    PROTEINUA Negative 07/24/2019    Annually No   Blood pressure BP Readings from Last 1 Encounters:   02/03/25 105/68    Less than 140/90 Yes   Dilated retinal exam Most Recent Eye Exam Date: Not Found Annually Yes   Foot exam   Most Recent Foot Exam Date: Not Found Annually Yes            Current Assessment & Plan   Update A1C. Plan to monitor hemoglobin A1C at designated intervals 3-6 months.  I recommend ongoing Education for diabetic diet and exercise protocol. Follow up with " Ophthalmology/Optometry and Podiatry at least annually.          Relevant Orders    Hemoglobin A1C    Comprehensive Metabolic Panel       Other    Abnormal CBC - Primary    Overview   Lab Results   Component Value Date    WBC 6.00 01/28/2025    HGB 18.1 (H) 01/28/2025    HCT 55.4 (H) 01/28/2025    MCV 97 01/28/2025     (L) 01/28/2025              Current Assessment & Plan   Recheck CBC. If H/H remains elevated, recommend therapeutic phlebotomy. Monitor labs.          Relevant Orders    CBC Without Differential    Hemoglobin A1C    Comprehensive Metabolic Panel     Follow up if symptoms worsen or fail to improve.  ER precautions for severe or worsening symptoms.     Raegan Pedraza, NP  _____________________________________________________________________________________________________________________________________________________    CC: labs    HPI: Patient is a 69-year-old male who presents virtually today as an established patient here to discuss recent labs. His H/H was elevated in January. He does take Clomid per the urologist. He has a history of HTN, Afib. Blood pressure is well controlled. There is no chest pain, palpitations, SOB. He is inquiring if he should donate blood.     Other chronic conditions have been reviewed and remains stable. Further details as stated above.     Past Medical History:  Past Medical History:   Diagnosis Date    Atrial fibrillation     Diverticulosis     Hyperlipidemia     Hypertension     Ruptured eardrum      Past Surgical History:   Procedure Laterality Date    COLONOSCOPY  2015    [facility]    CORONARY ARTERY BYPASS GRAFT  09/2014    [facility] [address] [facility]    EYE SURGERY  1992    [facility]    INNER EAR SURGERY Left 1980    NASAL SEPTUM SURGERY  03/2019    Dr Davis Obrien    PROSTATE BIOPSY  04/2011     Review of patient's allergies indicates:  No Known Allergies  Social History[1]  Family History   Problem Relation Name Age of Onset    Heart disease Mother Daly  Nedra         bypass    Arthritis Mother Daly Sneed     Cancer Father Johnny Sneed         Prostate removal    Heart disease Father Johnny Sneed         CHF    Vision loss Father Johnny Sneed         Macular degen    Hearing loss Father Johnny Sneed      Medications Ordered Prior to Encounter[2]    Review of Systems   Constitutional:  Negative for activity change, appetite change, chills, fatigue, fever and unexpected weight change.   HENT:  Negative for congestion, hearing loss, rhinorrhea, sore throat and trouble swallowing.    Eyes:  Negative for discharge and visual disturbance.   Respiratory:  Negative for cough, chest tightness, shortness of breath and wheezing.    Cardiovascular:  Negative for chest pain, palpitations and leg swelling.   Gastrointestinal:  Negative for abdominal pain, blood in stool, constipation, diarrhea and vomiting.   Endocrine: Negative for polydipsia and polyuria.   Genitourinary:  Negative for difficulty urinating, dysuria, hematuria and urgency.   Musculoskeletal:  Negative for arthralgias, joint swelling, myalgias and neck pain.   Skin:  Negative for rash and wound.   Neurological:  Negative for dizziness, weakness and headaches.   Psychiatric/Behavioral:  Negative for behavioral problems, confusion and dysphoric mood. The patient is not nervous/anxious.        There were no vitals filed for this visit.    Wt Readings from Last 3 Encounters:   02/03/25 93.1 kg (205 lb 3.2 oz)   01/31/25 91.2 kg (201 lb 1.6 oz)   05/20/24 89.7 kg (197 lb 12.8 oz)     Physical Exam  Vitals reviewed.   Constitutional:       General: He is awake. He is not in acute distress.     Appearance: Normal appearance. He is not ill-appearing.   HENT:      Head: Normocephalic and atraumatic.      Right Ear: External ear normal.      Left Ear: External ear normal.      Nose: Nose normal.   Eyes:      Extraocular Movements: Extraocular movements intact.      Conjunctiva/sclera: Conjunctivae  normal.   Pulmonary:      Effort: Pulmonary effort is normal. No respiratory distress.   Musculoskeletal:      Cervical back: Normal range of motion.   Skin:     Coloration: Skin is not pale.      Findings: No rash.   Neurological:      General: No focal deficit present.      Mental Status: He is alert and oriented to person, place, and time. Mental status is at baseline.   Psychiatric:         Mood and Affect: Mood and affect normal. Mood is not anxious, depressed or elated. Affect is not labile, blunt, flat, angry or tearful.         Speech: Speech normal. He is communicative. Speech is not rapid and pressured, delayed or slurred.         Behavior: Behavior normal. Behavior is not agitated, slowed, aggressive, withdrawn or hyperactive. Behavior is cooperative.         Thought Content: Thought content normal.         Judgment: Judgment normal.       Health Maintenance   Topic Date Due    RSV Vaccine (Age 60+ and Pregnant patients) (1 - Risk 60-74 years 1-dose series) Never done    Colorectal Cancer Screening  03/18/2025    TETANUS VACCINE  01/31/2026 (Originally 6/2/1973)    Shingles Vaccine (1 of 2) 01/31/2026 (Originally 6/2/2006)    COVID-19 Vaccine (2 - 2024-25 season) 01/31/2026 (Originally 9/1/2024)    PROSTATE-SPECIFIC ANTIGEN  01/17/2026    Hemoglobin A1c (Prediabetes)  01/28/2026    Lipid Panel  01/28/2026    Hepatitis C Screening  Completed    Influenza Vaccine  Completed    Pneumococcal Vaccines (Age 50+)  Completed     Visit today included increased complexity associated with the care of the episodic problem - see above- addressed and managing the longitudinal care of the patient due to the serious and/or complex managed problem(s) - see above.         [1]   Social History  Tobacco Use    Smoking status: Never    Smokeless tobacco: Never   Substance Use Topics    Alcohol use: Yes     Alcohol/week: 2.0 standard drinks of alcohol     Types: 2 Glasses of wine per week    Drug use: Never   [2]   Current  Outpatient Medications on File Prior to Visit   Medication Sig Dispense Refill    anastrozole (ARIMIDEX) 1 mg Tab Take by mouth once a week.      aspirin 81 MG Chew       CLOMID 50 mg tablet Take 50 mg by mouth once a week.      EDARBI 40 mg Tab Take 1 tablet by mouth once daily.      ergocalciferol (ERGOCALCIFEROL) 50,000 unit Cap Take 1 capsule (50,000 Units total) by mouth every 7 days. 4 capsule 11    ezetimibe (ZETIA) 10 mg tablet Take 1 tablet (10 mg total) by mouth once daily. 90 tablet 4    fluocinolone acetonide oiL 0.01 % Drop place TWO drops in ear(s) once daily 20 mL 0    metoprolol tartrate (LOPRESSOR) 25 MG tablet       rivaroxaban (XARELTO) 20 mg Tab Take 1 tablet (20 mg total) by mouth once daily. 90 tablet 4    rosuvastatin (CRESTOR) 40 MG Tab Take 1 tablet (40 mg total) by mouth once daily. 90 tablet 4     No current facility-administered medications on file prior to visit.

## 2025-04-09 NOTE — ASSESSMENT & PLAN NOTE
Update A1C. Plan to monitor hemoglobin A1C at designated intervals 3-6 months.  I recommend ongoing Education for diabetic diet and exercise protocol. Follow up with Ophthalmology/Optometry and Podiatry at least annually.

## 2025-04-10 ENCOUNTER — LAB VISIT (OUTPATIENT)
Dept: LAB | Facility: HOSPITAL | Age: 70
End: 2025-04-10
Attending: NURSE PRACTITIONER
Payer: MEDICARE

## 2025-04-10 DIAGNOSIS — R79.89 ABNORMAL CBC: ICD-10-CM

## 2025-04-10 DIAGNOSIS — R73.03 PREDIABETES: ICD-10-CM

## 2025-04-10 LAB
ALBUMIN SERPL BCP-MCNC: 3.6 G/DL (ref 3.5–5.2)
ALP SERPL-CCNC: 69 UNIT/L (ref 40–150)
ALT SERPL W/O P-5'-P-CCNC: 11 UNIT/L (ref 10–44)
ANION GAP (OHS): 8 MMOL/L (ref 8–16)
AST SERPL-CCNC: 17 UNIT/L (ref 11–45)
BILIRUB SERPL-MCNC: 0.6 MG/DL (ref 0.1–1)
BUN SERPL-MCNC: 16 MG/DL (ref 8–23)
CALCIUM SERPL-MCNC: 9.2 MG/DL (ref 8.7–10.5)
CHLORIDE SERPL-SCNC: 109 MMOL/L (ref 95–110)
CO2 SERPL-SCNC: 28 MMOL/L (ref 23–29)
CREAT SERPL-MCNC: 1.2 MG/DL (ref 0.5–1.4)
EAG (OHS): 108 MG/DL (ref 68–131)
ERYTHROCYTE [DISTWIDTH] IN BLOOD BY AUTOMATED COUNT: 12.7 % (ref 11.5–14.5)
GFR SERPLBLD CREATININE-BSD FMLA CKD-EPI: >60 ML/MIN/1.73/M2
GLUCOSE SERPL-MCNC: 103 MG/DL (ref 70–110)
HBA1C MFR BLD: 5.4 % (ref 4–5.6)
HCT VFR BLD AUTO: 46.6 % (ref 40–54)
HGB BLD-MCNC: 15.3 GM/DL (ref 14–18)
MCH RBC QN AUTO: 31.4 PG (ref 27–31)
MCHC RBC AUTO-ENTMCNC: 32.8 G/DL (ref 32–36)
MCV RBC AUTO: 96 FL (ref 82–98)
PLATELET # BLD AUTO: 131 K/UL (ref 150–450)
PMV BLD AUTO: 11.3 FL (ref 9.2–12.9)
POTASSIUM SERPL-SCNC: 3.9 MMOL/L (ref 3.5–5.1)
PROT SERPL-MCNC: 6.4 GM/DL (ref 6–8.4)
RBC # BLD AUTO: 4.87 M/UL (ref 4.6–6.2)
SODIUM SERPL-SCNC: 145 MMOL/L (ref 136–145)
WBC # BLD AUTO: 4.78 K/UL (ref 3.9–12.7)

## 2025-04-10 PROCEDURE — 83036 HEMOGLOBIN GLYCOSYLATED A1C: CPT

## 2025-04-10 PROCEDURE — 80053 COMPREHEN METABOLIC PANEL: CPT

## 2025-04-10 PROCEDURE — 36415 COLL VENOUS BLD VENIPUNCTURE: CPT | Mod: PO

## 2025-04-10 PROCEDURE — 85027 COMPLETE CBC AUTOMATED: CPT

## 2025-04-11 ENCOUNTER — RESULTS FOLLOW-UP (OUTPATIENT)
Dept: FAMILY MEDICINE | Facility: CLINIC | Age: 70
End: 2025-04-11

## 2025-05-28 LAB — CRC RECOMMENDATION EXT: NORMAL

## 2025-06-13 ENCOUNTER — PATIENT OUTREACH (OUTPATIENT)
Dept: ADMINISTRATIVE | Facility: HOSPITAL | Age: 70
End: 2025-06-13
Payer: MEDICARE

## 2025-06-13 NOTE — LETTER
[id number]    AUTHORIZATION FOR RELEASE OF   CONFIDENTIAL INFORMATION    Dear Dr. Davis Schroeder,    We are seeing Johnny Sneed, date of birth 1955, in the clinic at [facility]. Joseph Garcia MD is the patient's PCP. Johnny Sneed has an outstanding lab/procedure at the time we reviewed his chart. In order to help keep his health information updated, he has authorized us to request the following medical record(s):            ( x ) 2025 COLON PATHOLOGY REPORT          Please fax/email records to:  Fax #: [phone]  Email: [email or web address]     If you have any questions, please contact    PATI Bautista @ [phone]          Patient Name: Johnny Sneed  : 1955  Patient Phone #: [phone]

## 2025-06-13 NOTE — PROGRESS NOTES
Manually uploaded and hyper-linked COLONOSCOPY 5/28/2025  SUDHA faxed to Dr. Davis Schroeder 1x to request pathology report. Reminder set.

## 2025-06-19 DIAGNOSIS — E55.9 VITAMIN D DEFICIENCY: Chronic | ICD-10-CM

## 2025-06-19 RX ORDER — ERGOCALCIFEROL 1.25 MG/1
50000 CAPSULE ORAL
Qty: 4 CAPSULE | Refills: 11 | Status: SHIPPED | OUTPATIENT
Start: 2025-06-19

## 2025-06-19 NOTE — TELEPHONE ENCOUNTER
Refill Routing Note   Medication(s) are not appropriate for processing by [facility] for the following reason(s):        Outside of protocol  Non-participating provider    [facility] action(s):  Route               Appointments  past 12m or future 3m with PCP    Date Provider   Last Visit   4/9/2025 Raegan Pedraza NP   Next Visit   Visit date not found Raegan Pedraza NP   ED visits in past 90 days: 0        Note composed:12:37 PM 06/19/2025

## 2025-07-18 ENCOUNTER — PATIENT MESSAGE (OUTPATIENT)
Dept: FAMILY MEDICINE | Facility: CLINIC | Age: 70
End: 2025-07-18
Payer: MEDICARE

## 2025-07-18 NOTE — TELEPHONE ENCOUNTER
ALANA     Pt was having chest pains while lying down, went to [facility], BNP was 300. They administer IV lasix and discharged him with oral lasix for 3 days.     He has a follow up with his cardiologist this coming Tuesday     Wants to see cardiology before following up with you.

## 2025-08-27 DIAGNOSIS — Z79.899 ENCOUNTER FOR LONG-TERM (CURRENT) USE OF MEDICATIONS: ICD-10-CM

## 2025-08-27 RX ORDER — FLUOCINOLONE ACETONIDE 0.11 MG/ML
OIL AURICULAR (OTIC)
Qty: 20 ML | Refills: 0 | Status: SHIPPED | OUTPATIENT
Start: 2025-08-27